# Patient Record
Sex: FEMALE | URBAN - METROPOLITAN AREA
[De-identification: names, ages, dates, MRNs, and addresses within clinical notes are randomized per-mention and may not be internally consistent; named-entity substitution may affect disease eponyms.]

---
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
## 2024-03-25 ENCOUNTER — TELEPHONE (OUTPATIENT)
Dept: OBSTETRICS AND GYNECOLOGY | Facility: CLINIC | Age: 19
End: 2024-03-25
Payer: MEDICAID

## 2024-03-25 ENCOUNTER — LAB VISIT (OUTPATIENT)
Dept: LAB | Facility: HOSPITAL | Age: 19
End: 2024-03-25
Attending: STUDENT IN AN ORGANIZED HEALTH CARE EDUCATION/TRAINING PROGRAM
Payer: MEDICAID

## 2024-03-25 ENCOUNTER — TELEPHONE (OUTPATIENT)
Dept: OBSTETRICS AND GYNECOLOGY | Facility: CLINIC | Age: 19
End: 2024-03-25

## 2024-03-25 DIAGNOSIS — Z32.01 POSITIVE PREGNANCY TEST: Primary | ICD-10-CM

## 2024-03-25 DIAGNOSIS — Z32.01 POSITIVE PREGNANCY TEST: ICD-10-CM

## 2024-03-25 LAB — HCG INTACT+B SERPL-ACNC: NORMAL MIU/ML

## 2024-03-25 PROCEDURE — 84144 ASSAY OF PROGESTERONE: CPT | Performed by: STUDENT IN AN ORGANIZED HEALTH CARE EDUCATION/TRAINING PROGRAM

## 2024-03-25 PROCEDURE — 84702 CHORIONIC GONADOTROPIN TEST: CPT | Performed by: STUDENT IN AN ORGANIZED HEALTH CARE EDUCATION/TRAINING PROGRAM

## 2024-03-25 PROCEDURE — 36415 COLL VENOUS BLD VENIPUNCTURE: CPT | Performed by: STUDENT IN AN ORGANIZED HEALTH CARE EDUCATION/TRAINING PROGRAM

## 2024-03-26 ENCOUNTER — TELEPHONE (OUTPATIENT)
Dept: OBSTETRICS AND GYNECOLOGY | Facility: CLINIC | Age: 19
End: 2024-03-26
Payer: MEDICAID

## 2024-03-26 LAB — PROGEST SERPL-MCNC: 14.4 NG/ML

## 2024-03-26 NOTE — TELEPHONE ENCOUNTER
----- Message from Margarita Agarwal MD sent at 3/25/2024  8:28 PM CDT -----  Her beta is real high. May well be farther along  Is this the earliest ultrasound appt?

## 2024-03-26 NOTE — TELEPHONE ENCOUNTER
Called pt to let her know that due to levels being so high we will get her in for her dating ultrasound and pregnancy confirmation sooner.

## 2024-03-27 ENCOUNTER — LAB VISIT (OUTPATIENT)
Dept: LAB | Facility: HOSPITAL | Age: 19
End: 2024-03-27
Attending: STUDENT IN AN ORGANIZED HEALTH CARE EDUCATION/TRAINING PROGRAM
Payer: MEDICAID

## 2024-03-27 DIAGNOSIS — Z32.01 POSITIVE PREGNANCY TEST: ICD-10-CM

## 2024-03-27 LAB — HCG INTACT+B SERPL-ACNC: NORMAL MIU/ML

## 2024-03-27 PROCEDURE — 84702 CHORIONIC GONADOTROPIN TEST: CPT | Performed by: STUDENT IN AN ORGANIZED HEALTH CARE EDUCATION/TRAINING PROGRAM

## 2024-03-27 PROCEDURE — 36415 COLL VENOUS BLD VENIPUNCTURE: CPT | Performed by: STUDENT IN AN ORGANIZED HEALTH CARE EDUCATION/TRAINING PROGRAM

## 2024-04-02 ENCOUNTER — HOSPITAL ENCOUNTER (OUTPATIENT)
Dept: PERINATAL CARE | Facility: OTHER | Age: 19
Discharge: HOME | End: 2024-04-02
Attending: STUDENT IN AN ORGANIZED HEALTH CARE EDUCATION/TRAINING PROGRAM
Payer: MEDICAID

## 2024-04-02 ENCOUNTER — OFFICE VISIT (OUTPATIENT)
Dept: OBSTETRICS AND GYNECOLOGY | Facility: CLINIC | Age: 19
End: 2024-04-02
Payer: MEDICAID

## 2024-04-02 VITALS
BODY MASS INDEX: 23.51 KG/M2 | SYSTOLIC BLOOD PRESSURE: 122 MMHG | HEIGHT: 63 IN | DIASTOLIC BLOOD PRESSURE: 66 MMHG | WEIGHT: 132.69 LBS

## 2024-04-02 DIAGNOSIS — Z32.01 POSITIVE PREGNANCY TEST: Primary | ICD-10-CM

## 2024-04-02 DIAGNOSIS — Z32.01 POSITIVE PREGNANCY TEST: ICD-10-CM

## 2024-04-02 LAB
C TRACH DNA SPEC QL NAA+PROBE: NOT DETECTED
N GONORRHOEA DNA SPEC QL NAA+PROBE: NOT DETECTED

## 2024-04-02 PROCEDURE — 3074F SYST BP LT 130 MM HG: CPT | Mod: CPTII,,, | Performed by: STUDENT IN AN ORGANIZED HEALTH CARE EDUCATION/TRAINING PROGRAM

## 2024-04-02 PROCEDURE — 76801 OB US < 14 WKS SINGLE FETUS: CPT | Mod: 26,,, | Performed by: OBSTETRICS & GYNECOLOGY

## 2024-04-02 PROCEDURE — 87591 N.GONORRHOEAE DNA AMP PROB: CPT | Performed by: STUDENT IN AN ORGANIZED HEALTH CARE EDUCATION/TRAINING PROGRAM

## 2024-04-02 PROCEDURE — 81514 NFCT DS BV&VAGINITIS DNA ALG: CPT | Performed by: STUDENT IN AN ORGANIZED HEALTH CARE EDUCATION/TRAINING PROGRAM

## 2024-04-02 PROCEDURE — 87491 CHLMYD TRACH DNA AMP PROBE: CPT | Performed by: STUDENT IN AN ORGANIZED HEALTH CARE EDUCATION/TRAINING PROGRAM

## 2024-04-02 PROCEDURE — 3078F DIAST BP <80 MM HG: CPT | Mod: CPTII,,, | Performed by: STUDENT IN AN ORGANIZED HEALTH CARE EDUCATION/TRAINING PROGRAM

## 2024-04-02 PROCEDURE — 99999 PR PBB SHADOW E&M-EST. PATIENT-LVL II: CPT | Mod: PBBFAC,,, | Performed by: STUDENT IN AN ORGANIZED HEALTH CARE EDUCATION/TRAINING PROGRAM

## 2024-04-02 PROCEDURE — 87086 URINE CULTURE/COLONY COUNT: CPT | Performed by: STUDENT IN AN ORGANIZED HEALTH CARE EDUCATION/TRAINING PROGRAM

## 2024-04-02 PROCEDURE — 99204 OFFICE O/P NEW MOD 45 MIN: CPT | Mod: S$PBB,TH,, | Performed by: STUDENT IN AN ORGANIZED HEALTH CARE EDUCATION/TRAINING PROGRAM

## 2024-04-02 PROCEDURE — 99212 OFFICE O/P EST SF 10 MIN: CPT | Mod: PBBFAC,25,TH | Performed by: STUDENT IN AN ORGANIZED HEALTH CARE EDUCATION/TRAINING PROGRAM

## 2024-04-02 PROCEDURE — 76801 OB US < 14 WKS SINGLE FETUS: CPT

## 2024-04-02 PROCEDURE — 3008F BODY MASS INDEX DOCD: CPT | Mod: CPTII,,, | Performed by: STUDENT IN AN ORGANIZED HEALTH CARE EDUCATION/TRAINING PROGRAM

## 2024-04-02 RX ORDER — ASPIRIN 81 MG/1
81 TABLET ORAL DAILY
Qty: 150 TABLET | Refills: 2 | Status: SHIPPED | OUTPATIENT
Start: 2024-04-02 | End: 2025-04-02

## 2024-04-02 NOTE — PROGRESS NOTES
History & Physical  Gynecology      SUBJECTIVE:     Chief Complaint: Amenorrhea (Pt states that she has been having nausea all day )       History of Present Illness:  Pt is here for initial OB. She is with her mother. Feeling ok. Nausea persistent. Some constipation.   Feeling fatigued. Also has breast tenderness. No bleeding or pain.  Works as working and in school. Works at a [employer]. Works construction.   Relationship with partner living together with her mother and brothers. Safe at home. No cats.   Taking PNV   PMH: PNV  PSH: no abdominal or pelvic surgeries  Prior pregnancies none  Desires patch for contraception  Wants to breastfeed  Rojelio's mother has SCt. Ezra arroyo has crohn's and shizophrenia  Her mother had DM and HTN  No personal or family history of thyroid disease  No tobacco, EtOH or illicit drug use  No H/o HSV  Covid vaccinated       Review of patient's allergies indicates:  No Known Allergies    History reviewed. No pertinent past medical history.  History reviewed. No pertinent surgical history.  OB History    No obstetric history on file.       Family History   Problem Relation Age of Onset    Diabetes Maternal Grandfather     Diabetes Paternal Grandfather     Hypertension Paternal Grandfather     Asthma Brother      Social History     Tobacco Use    Smoking status: Never    Smokeless tobacco: Never   Substance Use Topics    Alcohol use: No    Drug use: No       Current Outpatient Medications   Medication Sig    prenatal no115/iron/folic acid (PRENATAL 19 ORAL) Take by mouth.    acetaminophen (TYLENOL) 500 MG tablet Take 1 tablet (500 mg total) by mouth every 4 (four) hours as needed for Pain or Temperature greater than (100.5 or greater). (Patient not taking: Reported on 4/2/2024)    cetirizine (ZYRTEC) 10 MG tablet Take 1 tablet (10 mg total) by mouth once daily.    clobetasol 0.05% (TEMOVATE) 0.05 % Oint Apply topically nightly.     dextroamphetamine-amphetamine (ADDERALL XR) 10 MG 24 hr capsule Take 1 capsule by mouth every morning (Patient not taking: Reported on 4/2/2024)    fluticasone propionate (FLONASE) 50 mcg/actuation nasal spray 1 spray (50 mcg total) by Each Nostril route once daily. (Patient not taking: Reported on 4/2/2024)    hydrocortisone (WESTCORT) 0.2 % cream Apply to eczema bid    ibuprofen (ADVIL,MOTRIN) 400 MG tablet Take 1 tablet (400 mg total) by mouth every 6 (six) hours as needed for Other or Temperature greater than (pain or temperature of 100.5 or greater). (Patient not taking: Reported on 4/2/2024)    loratadine (CLARITIN) 5 mg/5 mL syrup Take two teaspoons (10 ml) by mouth daily as needed for congestion    mometasone 0.1% (ELOCON) 0.1 % cream Apply to affected area daily    norelgestromin-ethinyl estradiol (XULANE) 150-35 mcg/24 hr Place 1 patch onto the skin.    phenoL (CHLORASEPTIC THROAT SPRAY) 1.4 % SprA by Mucous Membrane route every 2 (two) hours. (Patient not taking: Reported on 4/2/2024)    ranitidine (ZANTAC) 15 mg/mL syrup 10 ml PO BID for reflux pain    triamcinolone acetonide 0.1% (KENALOG) 0.1 % cream Apply topically 3 (three) times daily as needed.     No current facility-administered medications for this visit.         Review of Systems:  Review of Systems   Constitutional:  Positive for fatigue. Negative for activity change, appetite change and fever.   Gastrointestinal:  Positive for nausea and vomiting. Negative for abdominal pain.   Genitourinary:  Positive for menstrual problem. Negative for dysuria, hematuria, pelvic pain, vaginal bleeding, vaginal discharge, vaginal pain and vaginal odor.   Integumentary:  Negative for breast mass and nipple discharge.   Breast: Positive for mastodynia.Negative for lump, mass and nipple discharge     OBJECTIVE:     Physical Exam:  Physical Exam  Vitals reviewed.   Constitutional:       General: She is not in acute distress.     Appearance: She is  well-developed. She is not diaphoretic.   HENT:      Head: Normocephalic and atraumatic.   Eyes:      Conjunctiva/sclera: Conjunctivae normal.   Cardiovascular:      Rate and Rhythm: Normal rate.   Pulmonary:      Effort: Pulmonary effort is normal.   Abdominal:      General: There is no distension.      Palpations: Abdomen is soft. There is no mass.      Tenderness: There is no abdominal tenderness. There is no guarding or rebound.   Genitourinary:     Labia:         Right: No rash, tenderness, lesion or injury.         Left: No rash, tenderness, lesion or injury.       Vagina: No signs of injury and foreign body. No vaginal discharge, erythema, tenderness or bleeding.      Cervix: No cervical motion tenderness, discharge or friability.      Uterus: Not deviated, not enlarged, not fixed and not tender.       Adnexa:         Right: No mass, tenderness or fullness.          Left: No mass, tenderness or fullness.     Musculoskeletal:         General: Normal range of motion.      Cervical back: Normal range of motion.   Skin:     General: Skin is warm and dry.   Neurological:      Mental Status: She is alert.       ASSESSMENT:     No diagnosis found.       Plan:      - Patient's medical, surgical and family history reviewed. Medications reviewed.  - Early DM screening not indicated   - Discussed common complaints of pregnancy, home measures for N/V or pregnancy  - Info for [email or web address] given   - Patient was counseled today on: Blood test ordered on first visit, Vitamins, folic acid, Seafood and mercury, avoid , un refrigerated: deli meats, cheeses and milk products, reason to avoid cat litter,Toxoplasmosis precautions (Cats/Raw Meat) the accuracy of the MARISOL, the value of an early TVS, risks of each trimester, Sequential screening, OTC medication in the first trimester, harmful effects of Smoking, ETOH, recreational drugs, AFP screen at 15 weeks and MFM USG at 18-20 weeks.  HIV and other routine prenatal  tests,risk factors identified by prenatal history, Anticipated course of prenatal care, Nutrition and weight gain counseling, Exercise, Alcohol, Illicit/Recreational Drugs, Seat belt use.  - First trimester labs ordered.  - TVUS for dating reviewed .  - Genetic screening discussed and patient wishes to do M21 with AFP at 15 weeks.  - ASA recommended after 12 weeks      RTC 4 weeks for Routine OB Care.  Face to Face time with patient: 30    45 minutes of total time spent on the encounter, which includes face to face time and non-face to face time preparing to see the patient (eg, review of tests), Obtaining and/or reviewing separately obtained history, Documenting clinical information in the electronic or other health record, Independently interpreting results (not separately reported) and communicating results to the patient/family/caregiver, or Care coordination (not separately reported).      Margarita Agarwal

## 2024-04-02 NOTE — PATIENT INSTRUCTIONS
[email or web address]    Nausea and vomiting  Vitamin B6 (pyridoxine) 50-100mg orally with Doxylamine (unisom) 12.5mg orally every 6-8 hours as needed for nausea. If the fatigue is too bad, you can try just the B6. Lastly, if taking it as needed is not helping, consider taking the two together on a scheduled basis.    Medications  Avoid NSAIDs (aleve, motrin, ibuprofen)  Use Tylenol or Acetaminophen for aches/pains, headaches  Begin a baby aspirin once daily (81mg) after 12 weeks  Pecid up to twice a day  GasX or simethicone for gas pains  Colace for constipation  Medications that are pregnancy category A, B or C are accepted as safe during pregnancy    Genetic Testing  [id number]: best test we have, most sensitive. Tells gender. Call Toll Free to get survey to decrease cost [phone]. website has a video about the test KUBOO/videos  Sequential Screen: second best test includes ultrasound around 11 weeks and blood tests at 11 and 15 weeks  Quad screen: least sensitive for chromosomal issues, but would tell us about neural tube defects (such as spina bifida), blood work after 15 weeks   (Sequential or Quad do not tell sex of baby)    Caffiene  Less than 200mg per day    Exercise  Listen to your body  Don't stay with heart rate >140bpm    Weight Gain  -- Recommended weight gain of:          Underweight        Less than 18.5            28-40            Normal Weight     18.5-24.9                    25-35            Overweight          25-29.9                       15-25            Obese                  30 and greater             11-20      Covid  [email or web address]://[email or web address]/[email or web address]-health/faqs/[email or web address]-[email or web address]-breastfeeding    https://www.[facility].org/womens-health/experts-and-stories/the-latest/xqs-xa-bm-know-the-covid-19-vaccines-are-safe-and-effective-one-expert-explains    [email or web address]-[email or web address]

## 2024-04-04 ENCOUNTER — TELEPHONE (OUTPATIENT)
Dept: OBSTETRICS AND GYNECOLOGY | Facility: CLINIC | Age: 19
End: 2024-04-04
Payer: MEDICAID

## 2024-04-04 DIAGNOSIS — Z34.03 ENCOUNTER FOR SUPERVISION OF NORMAL FIRST PREGNANCY IN THIRD TRIMESTER: Primary | ICD-10-CM

## 2024-04-04 LAB — BACTERIA UR CULT: NORMAL

## 2024-05-15 ENCOUNTER — INITIAL PRENATAL (OUTPATIENT)
Dept: OBSTETRICS AND GYNECOLOGY | Facility: CLINIC | Age: 19
End: 2024-05-15
Payer: MEDICAID

## 2024-05-15 VITALS
DIASTOLIC BLOOD PRESSURE: 60 MMHG | SYSTOLIC BLOOD PRESSURE: 106 MMHG | BODY MASS INDEX: 25.81 KG/M2 | WEIGHT: 145.75 LBS

## 2024-05-15 DIAGNOSIS — Z28.39 RUBELLA NONIMMUNE STATUS, DELIVERED, CURRENT HOSPITALIZATION: ICD-10-CM

## 2024-05-15 DIAGNOSIS — Z34.00 SUPERVISION OF NORMAL FIRST PREGNANCY, ANTEPARTUM: Primary | ICD-10-CM

## 2024-05-15 PROCEDURE — 99204 OFFICE O/P NEW MOD 45 MIN: CPT | Mod: S$PBB,TH,, | Performed by: STUDENT IN AN ORGANIZED HEALTH CARE EDUCATION/TRAINING PROGRAM

## 2024-05-15 PROCEDURE — 99999 PR PBB SHADOW E&M-EST. PATIENT-LVL II: CPT | Mod: PBBFAC,,, | Performed by: STUDENT IN AN ORGANIZED HEALTH CARE EDUCATION/TRAINING PROGRAM

## 2024-05-15 PROCEDURE — 99212 OFFICE O/P EST SF 10 MIN: CPT | Mod: PBBFAC,TH | Performed by: STUDENT IN AN ORGANIZED HEALTH CARE EDUCATION/TRAINING PROGRAM

## 2024-05-15 NOTE — PROGRESS NOTES
Pt doing well. Unisom helps with nausea, but making her tired. Will try b6. Denies vaginal bleeding, headaches. Some cramping when urinating. Unsure if she is feeling quickening  VS reviewed.  Connected mom sent  Ordered today: anatomy scan  Glucola instructions completed  [facility] info given  RTC: 4 weeks

## 2024-05-15 NOTE — PATIENT INSTRUCTIONS
[facility], Labor and Delivery is on the 6th floor of [facility]: [phone]    [email or web address]      Vitamin B6 (pyridoxine) 10-25mg orally with Doxylamine (unisom) 12.5mg orally every 6-8 hours as needed for nausea. If the fatigue is too bad, you can try just the B6. Lastly, if taking it as needed is not helping, consider taking the two together on a scheduled basis.      Dear Rojelio Duong    Congratulations on your pregnancy! Dr. Agarwal has placed an order for you to join the [facility] Connected MOM program at no additional cost to you. Through this program, you can monitor your pregnancy from the comfort of home and stay connected to your OB Team between office visits.    When you sign up, you will receive a digital blood pressure cuff to send readings to your OB Team from home to ensure your readings are within healthy levels and catch potential issues sooner.    Get started now! 2 Easy Steps to Enroll:     Click [email or web address][here] to complete the consent questionnaire in your [facility] Account.        Receive your device and take your first reading to finish enrollment.   Your device can be delivered directly to you, or you can pick it up at an [facility] location.        Have questions? Learn more here!

## 2024-05-17 ENCOUNTER — PATIENT MESSAGE (OUTPATIENT)
Dept: OTHER | Facility: OTHER | Age: 19
End: 2024-05-17
Payer: MEDICAID

## 2024-06-04 ENCOUNTER — PROCEDURE VISIT (OUTPATIENT)
Dept: MATERNAL FETAL MEDICINE | Facility: CLINIC | Age: 19
End: 2024-06-04
Payer: MEDICAID

## 2024-06-04 DIAGNOSIS — Z34.00 SUPERVISION OF NORMAL FIRST PREGNANCY, ANTEPARTUM: ICD-10-CM

## 2024-06-04 PROCEDURE — 76805 OB US >/= 14 WKS SNGL FETUS: CPT | Mod: PBBFAC | Performed by: OBSTETRICS & GYNECOLOGY

## 2024-06-06 DIAGNOSIS — Z36.2 ENCOUNTER FOR FOLLOW-UP ULTRASOUND OF FETAL ANATOMY: Primary | ICD-10-CM

## 2024-06-07 ENCOUNTER — PATIENT MESSAGE (OUTPATIENT)
Dept: OTHER | Facility: OTHER | Age: 19
End: 2024-06-07
Payer: MEDICAID

## 2024-06-12 ENCOUNTER — TELEPHONE (OUTPATIENT)
Dept: OBSTETRICS AND GYNECOLOGY | Facility: CLINIC | Age: 19
End: 2024-06-12
Payer: MEDICAID

## 2024-07-04 ENCOUNTER — HOSPITAL ENCOUNTER (EMERGENCY)
Facility: OTHER | Age: 19
Discharge: HOME | End: 2024-07-04
Attending: OBSTETRICS & GYNECOLOGY
Payer: MEDICAID

## 2024-07-04 VITALS
HEART RATE: 82 BPM | DIASTOLIC BLOOD PRESSURE: 69 MMHG | RESPIRATION RATE: 14 BRPM | TEMPERATURE: 99 F | OXYGEN SATURATION: 100 % | SYSTOLIC BLOOD PRESSURE: 119 MMHG

## 2024-07-04 DIAGNOSIS — N93.9 VAGINAL BLEEDING: Primary | ICD-10-CM

## 2024-07-04 DIAGNOSIS — Z3A.23 23 WEEKS GESTATION OF PREGNANCY: ICD-10-CM

## 2024-07-04 LAB
ALBUMIN SERPL BCP-MCNC: 3.4 G/DL (ref 3.5–5.2)
ALP SERPL-CCNC: 69 U/L (ref 55–135)
ALT SERPL W/O P-5'-P-CCNC: 16 U/L (ref 10–44)
ANION GAP SERPL CALC-SCNC: 8 MMOL/L (ref 8–16)
AST SERPL-CCNC: 22 U/L (ref 10–40)
BASOPHILS # BLD AUTO: 0.03 K/UL (ref 0–0.2)
BASOPHILS NFR BLD: 0.3 % (ref 0–1.9)
BILIRUB SERPL-MCNC: 0.3 MG/DL (ref 0.1–1)
BILIRUB SERPL-MCNC: NORMAL MG/DL
BLOOD URINE, POC: NORMAL
BUN SERPL-MCNC: 8 MG/DL (ref 6–20)
CALCIUM SERPL-MCNC: 9 MG/DL (ref 8.7–10.5)
CHLORIDE SERPL-SCNC: 105 MMOL/L (ref 95–110)
CO2 SERPL-SCNC: 22 MMOL/L (ref 23–29)
COLOR, POC UA: YELLOW
CREAT SERPL-MCNC: 0.9 MG/DL (ref 0.5–1.4)
CREAT UR-MCNC: 105 MG/DL (ref 15–325)
DIFFERENTIAL METHOD BLD: ABNORMAL
EOSINOPHIL # BLD AUTO: 0.1 K/UL (ref 0–0.5)
EOSINOPHIL NFR BLD: 0.9 % (ref 0–8)
ERYTHROCYTE [DISTWIDTH] IN BLOOD BY AUTOMATED COUNT: 12.6 % (ref 11.5–14.5)
EST. GFR  (NO RACE VARIABLE): >60 ML/MIN/1.73 M^2
GLUCOSE SERPL-MCNC: 104 MG/DL (ref 70–110)
GLUCOSE UR QL STRIP: NORMAL
HCT VFR BLD AUTO: 33.6 % (ref 37–48.5)
HGB BLD-MCNC: 11.4 G/DL (ref 12–16)
IMM GRANULOCYTES # BLD AUTO: 0.1 K/UL (ref 0–0.04)
IMM GRANULOCYTES NFR BLD AUTO: 0.9 % (ref 0–0.5)
KETONES UR QL STRIP: NORMAL
LEUKOCYTE ESTERASE URINE, POC: NORMAL
LYMPHOCYTES # BLD AUTO: 2.2 K/UL (ref 1–4.8)
LYMPHOCYTES NFR BLD: 19.3 % (ref 18–48)
MCH RBC QN AUTO: 31.6 PG (ref 27–31)
MCHC RBC AUTO-ENTMCNC: 33.9 G/DL (ref 32–36)
MCV RBC AUTO: 93 FL (ref 82–98)
MONOCYTES # BLD AUTO: 0.6 K/UL (ref 0.3–1)
MONOCYTES NFR BLD: 5.6 % (ref 4–15)
NEUTROPHILS # BLD AUTO: 8.2 K/UL (ref 1.8–7.7)
NEUTROPHILS NFR BLD: 73 % (ref 38–73)
NITRITE, POC UA: NORMAL
NRBC BLD-RTO: 0 /100 WBC
PH, POC UA: 6
PLATELET # BLD AUTO: 205 K/UL (ref 150–450)
PMV BLD AUTO: 10.5 FL (ref 9.2–12.9)
POTASSIUM SERPL-SCNC: 3.8 MMOL/L (ref 3.5–5.1)
PROT SERPL-MCNC: 8 G/DL (ref 6–8.4)
PROT UR-MCNC: 10 MG/DL (ref 0–15)
PROT/CREAT UR: 0.1 MG/G{CREAT} (ref 0–0.2)
PROTEIN, POC: NORMAL
RBC # BLD AUTO: 3.61 M/UL (ref 4–5.4)
SODIUM SERPL-SCNC: 135 MMOL/L (ref 136–145)
SPECIFIC GRAVITY, POC UA: 1.01
UROBILINOGEN, POC UA: NORMAL
WBC # BLD AUTO: 11.19 K/UL (ref 3.9–12.7)

## 2024-07-04 PROCEDURE — 80053 COMPREHEN METABOLIC PANEL: CPT | Performed by: OBSTETRICS & GYNECOLOGY

## 2024-07-04 PROCEDURE — 99284 EMERGENCY DEPT VISIT MOD MDM: CPT | Mod: 25,,, | Performed by: OBSTETRICS & GYNECOLOGY

## 2024-07-04 PROCEDURE — 99284 EMERGENCY DEPT VISIT MOD MDM: CPT | Mod: 25

## 2024-07-04 PROCEDURE — 59025 FETAL NON-STRESS TEST: CPT

## 2024-07-04 PROCEDURE — 82570 ASSAY OF URINE CREATININE: CPT | Performed by: OBSTETRICS & GYNECOLOGY

## 2024-07-04 PROCEDURE — 59025 FETAL NON-STRESS TEST: CPT | Mod: 26,,, | Performed by: OBSTETRICS & GYNECOLOGY

## 2024-07-04 PROCEDURE — 85025 COMPLETE CBC W/AUTO DIFF WBC: CPT | Performed by: OBSTETRICS & GYNECOLOGY

## 2024-07-04 PROCEDURE — 81002 URINALYSIS NONAUTO W/O SCOPE: CPT

## 2024-07-04 NOTE — ED PROVIDER NOTES
Encounter Date: 2024       History     Chief Complaint   Patient presents with    Vaginal Bleeding     Rojelio Duong is a 19 y.o. F at 23w6d who presents for bleeding with wiping, onset this AM. Patient reports that she has a history of constipation and took a laxative this AM. She reports 3 episodes of Bms and each time she had a BM she reports that she saw blood with wiping. She believes that the bleeding is from the vagina. She denies any contractions, pain with urination, itching or burning.   This IUP is uncomplicated.  Patient denies contractions, denies vaginal bleeding, denies LOF.   Fetal Movement: normal       Review of patient's allergies indicates:  No Known Allergies  No past medical history on file.  No past surgical history on file.  Family History   Problem Relation Name Age of Onset    Diabetes Maternal Grandfather      Diabetes Paternal Grandfather      Hypertension Paternal Grandfather      Asthma Brother       Social History     Tobacco Use    Smoking status: Never    Smokeless tobacco: Never   Substance Use Topics    Alcohol use: No    Drug use: No     Review of Systems   Constitutional:  Negative for chills and fever.   HENT:  Negative for congestion and sore throat.    Eyes:  Negative for visual disturbance.   Respiratory:  Negative for cough and shortness of breath.    Cardiovascular:  Negative for chest pain and palpitations.   Gastrointestinal:  Negative for abdominal pain, blood in stool, constipation, diarrhea, nausea and vomiting.   Genitourinary:  Positive for vaginal bleeding (bleeding with wiping). Negative for dysuria, hematuria and vaginal discharge.   Musculoskeletal:  Negative for back pain.   Neurological:  Negative for dizziness, light-headedness and headaches.       Physical Exam     Initial Vitals [24 1214]   BP Pulse Resp Temp SpO2   (!) 137/91 95 14 99 °F (37.2 °C) 100 %      MAP       --         Physical Exam    Vitals reviewed.  Constitutional: She appears  well-developed and well-nourished.   HENT:   Head: Normocephalic and atraumatic.   Eyes: EOM are normal.   Cardiovascular:  Normal rate.           Pulmonary/Chest: No respiratory distress.   Abdominal: Abdomen is soft. There is no abdominal tenderness.   Gravid abdomen There is no rebound and no guarding.   Musculoskeletal:         General: No tenderness or edema.     Neurological: She is alert and oriented to person, place, and time.   Skin: Skin is warm and dry.   Psychiatric: She has a normal mood and affect. Her behavior is normal. Thought content normal.     OB LABOR EXAM:       Method: Sterile vaginal exam per MD and Sterile speculum exam per MD.       Dilatation: 0.   Station: -3.   Effacement: 50%.       Comments: SVE: cl/th/hi  SSE: negative blood in the vaginal vault, no bleeding at the cervix       ED Course   Obtain Fetal nonstress test (NST)    Date/Time: 7/4/2024 1:25 PM    Performed by: Angie Jenkins MD  Authorized by: Angie Jenkins MD    Nonstress Test:     Variability:  6-25 BPM    Decelerations:  None    Accelerations:  15 bpm    Baseline:  150    Contractions:  Not present  Biophysical Profile:     Nonstress Test Interpretation: reactive      Overall Impression:  Reassuring  Post-procedure:     Patient tolerance:  Patient tolerated the procedure well with no immediate complications    Labs Reviewed   COMPREHENSIVE METABOLIC PANEL - Abnormal; Notable for the following components:       Result Value    Sodium 135 (*)     CO2 22 (*)     Albumin 3.4 (*)     All other components within normal limits   CBC W/ AUTO DIFFERENTIAL - Abnormal; Notable for the following components:    RBC 3.61 (*)     Hemoglobin 11.4 (*)     Hematocrit 33.6 (*)     MCH 31.6 (*)     Immature Granulocytes 0.9 (*)     Gran # (ANC) 8.2 (*)     Immature Grans (Abs) 0.10 (*)     All other components within normal limits   PROTEIN / CREATININE RATIO, URINE    Narrative:     Specimen Source->Urine   POCT URINALYSIS, DIPSTICK OR  TABLET REAGENT, AUTOMATED, WITH MICROSCOP     Recent Labs   Lab 24  1241 24  1256   WBC  --  11.19   HGB  --  11.4*   HCT  --  33.6*   PLT  --  205   BUN  --  8   CREATININE  --  0.9   ALT  --  16   AST  --  22   UTPCR 0.10  --              Imaging Results    None          Medications - No data to display  Medical Decision Making  19 y.o. F at 23w6d who presents for bleeding with wiping, onset this AM.    Temp:  [99 °F (37.2 °C)] 99 °F (37.2 °C)  Pulse:  [] 82  Resp:  [14] 14  SpO2:  [100 %] 100 %  BP: (118-137)/(69-91) 119/69    NST: reactive and reassuring  TOCO: without contractions    SVE: cl/th/hi  SSE: negative blood in the vaginal vault, no bleeding at the cervix  No hemorrhoids noted     Elevated BP x1  - BP: (118-137)/(69-91) 119/69   - CBC: 11.4/33.6/205  - Cr: 0.9  - AST/ALT: 22/16  - P/C Ratio: 0.10    - Patient stable for discharge at this time  - ED return precautions given  - She voiced understanding and is in agreement with the plan     Angie Jenkins MD  Obstetrics and Gynecology - PGY2     Amount and/or Complexity of Data Reviewed  Labs: ordered.              Attending Attestation:   Physician Attestation Statement for Resident:  As the supervising MD   Physician Attestation Statement: I have personally seen and examined this patient.   I agree with the above history.  -:   As the supervising MD I agree with the above PE.     As the supervising MD I agree with the above treatment, course, plan, and disposition.   -: I agree with the above edited resident note. Pt seen and examined, chart and labs reviewed.    Briefly, 18 yo G1 at 23w6d presents for an episode of bleeding with wiping today. Afebrile, VSS, single mild range BP on arrival to [facility]. Asymptomatic for S/S of PreE, labs obtained and unremarkable- Plts 205, Cr 0.9, AST/ALT 22/16 and P:C 0.10. NST Cat I appropriate for GA, [place] quiet. SSE with long/closed cervix and no visible blood in vault or from os. No hemorrhoids  noted. Pt reassured by normal findings. Bleeding return precautions reviewed.     All questions answered. Stable for d/c home with outpatient follow up.     Yolis Chau MD  OB Hospitalist  7/4/2024     I was personally present during the critical portions of the procedure(s) performed by the resident and was immediately available in the ED to provide services and assistance as needed during the entire procedure.  I have reviewed and agree with the residents interpretation of the following: lab data.  I have reviewed the following: old records at this facility.                                       Clinical Impression:  Final diagnoses:  [N93.9] Vaginal bleeding (Primary)  [Z3A.23] 23 weeks gestation of pregnancy          ED Disposition Condition    Discharge Stable          ED Prescriptions    None       Follow-up Information    None          Angie Jenkins MD  Resident  07/04/24 [id number]       Yolis Chau MD  07/04/24 [id number]

## 2024-07-04 NOTE — DISCHARGE INSTRUCTIONS
Return if you experience contractions, uterine tightening or cramps(more than 4 in 1 hour for 2 consecutive hours)  Backache that comes and goes  Pelvic pressure  Change in vaginal discharge  Vaginal Bleeding  Leaking of fluid  Call the OB [phone]) during regular business hours or L&D([phone]) after hours for any questions or concerns  Keep previously scheduled clinic appointment  Drink 8-10 glasses of water a day     
Yes

## 2024-07-05 ENCOUNTER — PATIENT MESSAGE (OUTPATIENT)
Dept: OTHER | Facility: OTHER | Age: 19
End: 2024-07-05
Payer: MEDICAID

## 2024-07-16 ENCOUNTER — LAB VISIT (OUTPATIENT)
Dept: LAB | Facility: OTHER | Age: 19
End: 2024-07-16
Attending: STUDENT IN AN ORGANIZED HEALTH CARE EDUCATION/TRAINING PROGRAM
Payer: MEDICAID

## 2024-07-16 ENCOUNTER — PROCEDURE VISIT (OUTPATIENT)
Dept: MATERNAL FETAL MEDICINE | Facility: CLINIC | Age: 19
End: 2024-07-16
Payer: MEDICAID

## 2024-07-16 ENCOUNTER — ROUTINE PRENATAL (OUTPATIENT)
Dept: OBSTETRICS AND GYNECOLOGY | Facility: CLINIC | Age: 19
End: 2024-07-16
Payer: MEDICAID

## 2024-07-16 VITALS
DIASTOLIC BLOOD PRESSURE: 78 MMHG | WEIGHT: 164.44 LBS | SYSTOLIC BLOOD PRESSURE: 138 MMHG | BODY MASS INDEX: 29.13 KG/M2

## 2024-07-16 DIAGNOSIS — Z32.01 POSITIVE PREGNANCY TEST: ICD-10-CM

## 2024-07-16 DIAGNOSIS — Z3A.25 25 WEEKS GESTATION OF PREGNANCY: Primary | ICD-10-CM

## 2024-07-16 DIAGNOSIS — Z36.2 ENCOUNTER FOR FOLLOW-UP ULTRASOUND OF FETAL ANATOMY: ICD-10-CM

## 2024-07-16 LAB
BASOPHILS # BLD AUTO: 0.03 K/UL (ref 0–0.2)
BASOPHILS NFR BLD: 0.3 % (ref 0–1.9)
DIFFERENTIAL METHOD BLD: ABNORMAL
EOSINOPHIL # BLD AUTO: 0.1 K/UL (ref 0–0.5)
EOSINOPHIL NFR BLD: 0.8 % (ref 0–8)
ERYTHROCYTE [DISTWIDTH] IN BLOOD BY AUTOMATED COUNT: 12.4 % (ref 11.5–14.5)
GLUCOSE SERPL-MCNC: 110 MG/DL (ref 70–140)
HCT VFR BLD AUTO: 31.6 % (ref 37–48.5)
HGB BLD-MCNC: 10.3 G/DL (ref 12–16)
IMM GRANULOCYTES # BLD AUTO: 0.11 K/UL (ref 0–0.04)
IMM GRANULOCYTES NFR BLD AUTO: 1 % (ref 0–0.5)
LYMPHOCYTES # BLD AUTO: 1.5 K/UL (ref 1–4.8)
LYMPHOCYTES NFR BLD: 14 % (ref 18–48)
MCH RBC QN AUTO: 30.7 PG (ref 27–31)
MCHC RBC AUTO-ENTMCNC: 32.6 G/DL (ref 32–36)
MCV RBC AUTO: 94 FL (ref 82–98)
MONOCYTES # BLD AUTO: 0.5 K/UL (ref 0.3–1)
MONOCYTES NFR BLD: 5 % (ref 4–15)
NEUTROPHILS # BLD AUTO: 8.4 K/UL (ref 1.8–7.7)
NEUTROPHILS NFR BLD: 78.9 % (ref 38–73)
NRBC BLD-RTO: 0 /100 WBC
PLATELET # BLD AUTO: 179 K/UL (ref 150–450)
PMV BLD AUTO: 10.9 FL (ref 9.2–12.9)
RBC # BLD AUTO: 3.35 M/UL (ref 4–5.4)
WBC # BLD AUTO: 10.69 K/UL (ref 3.9–12.7)

## 2024-07-16 PROCEDURE — 82950 GLUCOSE TEST: CPT | Performed by: STUDENT IN AN ORGANIZED HEALTH CARE EDUCATION/TRAINING PROGRAM

## 2024-07-16 PROCEDURE — 86780 TREPONEMA PALLIDUM: CPT | Performed by: STUDENT IN AN ORGANIZED HEALTH CARE EDUCATION/TRAINING PROGRAM

## 2024-07-16 PROCEDURE — 86787 VARICELLA-ZOSTER ANTIBODY: CPT | Performed by: STUDENT IN AN ORGANIZED HEALTH CARE EDUCATION/TRAINING PROGRAM

## 2024-07-16 PROCEDURE — 36415 COLL VENOUS BLD VENIPUNCTURE: CPT | Performed by: STUDENT IN AN ORGANIZED HEALTH CARE EDUCATION/TRAINING PROGRAM

## 2024-07-16 PROCEDURE — 85025 COMPLETE CBC W/AUTO DIFF WBC: CPT | Performed by: STUDENT IN AN ORGANIZED HEALTH CARE EDUCATION/TRAINING PROGRAM

## 2024-07-16 PROCEDURE — 76816 OB US FOLLOW-UP PER FETUS: CPT | Mod: PBBFAC | Performed by: OBSTETRICS & GYNECOLOGY

## 2024-07-16 PROCEDURE — 99999 PR PBB SHADOW E&M-EST. PATIENT-LVL III: CPT | Mod: PBBFAC,,,

## 2024-07-16 PROCEDURE — 99213 OFFICE O/P EST LOW 20 MIN: CPT | Mod: PBBFAC,25

## 2024-07-16 PROCEDURE — 99213 OFFICE O/P EST LOW 20 MIN: CPT | Mod: TH,S$PBB,,

## 2024-07-16 NOTE — PROGRESS NOTES
Reason for visit: Routine Prenatal Visit      HPI:   19 y.o., at 25w4d by Estimated Date of Delivery: 10/25/24  Doing well. States she feels lots of movement. Did go to OB ED 24 for vaginal bleeding, cervix closed.     - Contractions: no  - Bleeding: no  - Loss of fluid: no  - Fetal movement: yes  - Nausea: no  - Vomiting: no  - Headache: no    Reviewed:    Past medical, surgical, social, family, and obstetric history: Reviewed and updated in EMR.  Medications: Reviewed and updated in EMR.  Allergies: Patient has no known allergies.    Pregnancy dating, labs, ultrasound reports, prenatal testing, and problem list: Reviewed and updated in EMR.  Outside records: no  Independent interpretation of tests: no  Discussion with another healthcare professional: no    Vitals: /78   Wt 74.6 kg (164 lb 7.4 oz)   BMI 29.13 kg/m²     Physical exam:  GENERAL: No acute distress  ABD: Gravid    Assessment and Plan:    25 weeks gestation of pregnancy      Glucose and CBC today  Follow up anatomy today   Discussed connected mom, has not set it up yet     labor precautions given  Follow-up: 4 weeks      I spent a total of 20 minutes on the day of the visit. This includes face to face time and non-face to face time preparing to see the patient (eg, review of tests), Obtaining and/or reviewing separately obtained history, Documenting clinical information in the electronic or other health record, Independently interpreting results and communicating results to the patient/family/caregiver, or Care coordination.

## 2024-07-17 LAB
VARICELLA INTERPRETATION: POSITIVE
VARICELLA ZOSTER IGG: 220

## 2024-07-18 LAB — T PALLIDUM AB SER QL IF: NORMAL

## 2024-07-19 ENCOUNTER — PATIENT MESSAGE (OUTPATIENT)
Dept: OTHER | Facility: OTHER | Age: 19
End: 2024-07-19
Payer: MEDICAID

## 2024-08-02 ENCOUNTER — PATIENT MESSAGE (OUTPATIENT)
Dept: OTHER | Facility: OTHER | Age: 19
End: 2024-08-02
Payer: MEDICAID

## 2024-08-13 ENCOUNTER — PATIENT MESSAGE (OUTPATIENT)
Dept: OBSTETRICS AND GYNECOLOGY | Facility: CLINIC | Age: 19
End: 2024-08-13

## 2024-08-13 ENCOUNTER — CLINICAL SUPPORT (OUTPATIENT)
Dept: OBSTETRICS AND GYNECOLOGY | Facility: CLINIC | Age: 19
End: 2024-08-13
Payer: MEDICAID

## 2024-08-13 ENCOUNTER — ROUTINE PRENATAL (OUTPATIENT)
Dept: OBSTETRICS AND GYNECOLOGY | Facility: CLINIC | Age: 19
End: 2024-08-13
Payer: MEDICAID

## 2024-08-13 VITALS
BODY MASS INDEX: 28.86 KG/M2 | WEIGHT: 162.94 LBS | DIASTOLIC BLOOD PRESSURE: 78 MMHG | SYSTOLIC BLOOD PRESSURE: 122 MMHG

## 2024-08-13 DIAGNOSIS — Z34.00 SUPERVISION OF NORMAL FIRST PREGNANCY, ANTEPARTUM: Primary | ICD-10-CM

## 2024-08-13 DIAGNOSIS — Z23 NEED FOR TDAP VACCINATION: Primary | ICD-10-CM

## 2024-08-13 PROCEDURE — 99999 PR PBB SHADOW E&M-EST. PATIENT-LVL III: CPT | Mod: PBBFAC,,, | Performed by: STUDENT IN AN ORGANIZED HEALTH CARE EDUCATION/TRAINING PROGRAM

## 2024-08-13 PROCEDURE — 90715 TDAP VACCINE 7 YRS/> IM: CPT | Mod: PBBFAC

## 2024-08-13 PROCEDURE — 99213 OFFICE O/P EST LOW 20 MIN: CPT | Mod: PBBFAC,TH | Performed by: STUDENT IN AN ORGANIZED HEALTH CARE EDUCATION/TRAINING PROGRAM

## 2024-08-13 PROCEDURE — 99999PBSHW TDAP VACCINE GREATER THAN OR EQUAL TO 7YO IM: Mod: PBBFAC,,,

## 2024-08-13 PROCEDURE — 90471 IMMUNIZATION ADMIN: CPT | Mod: PBBFAC

## 2024-08-13 NOTE — PROGRESS NOTES
Here for Tdap injection. Patient without complaint of pain at this time, injection given. Tolerated well no pain noted post injection advised to wait in lobby 15 minutes and report any adverse reactions.     Site:rd

## 2024-08-13 NOTE — PATIENT INSTRUCTIONS
[facility], Labor and Delivery is on the 6th floor of [facility]: [phone]    [email or web address]

## 2024-08-13 NOTE — PROGRESS NOTES
Pt doing well. GERD. Not taking rx. 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 Work restrictions letter given  Denies vaginal bleeding, LOF, contractions. Reports regular fetal movement. Denies symptoms of pre E.  VS reviewed.  Tdap today  RSV recs reviewed   Peds list given  Labor and pre E precautions reviewed.   RTC: 2-4 weeks

## 2024-08-16 ENCOUNTER — PATIENT MESSAGE (OUTPATIENT)
Dept: OTHER | Facility: OTHER | Age: 19
End: 2024-08-16
Payer: MEDICAID

## 2024-08-30 ENCOUNTER — PATIENT MESSAGE (OUTPATIENT)
Dept: OTHER | Facility: OTHER | Age: 19
End: 2024-08-30
Payer: MEDICAID

## 2024-09-10 ENCOUNTER — ROUTINE PRENATAL (OUTPATIENT)
Dept: OBSTETRICS AND GYNECOLOGY | Facility: CLINIC | Age: 19
End: 2024-09-10
Payer: MEDICAID

## 2024-09-10 VITALS
BODY MASS INDEX: 30.19 KG/M2 | WEIGHT: 170.44 LBS | SYSTOLIC BLOOD PRESSURE: 118 MMHG | DIASTOLIC BLOOD PRESSURE: 68 MMHG

## 2024-09-10 DIAGNOSIS — Z34.00 SUPERVISION OF NORMAL FIRST PREGNANCY, ANTEPARTUM: Primary | ICD-10-CM

## 2024-09-10 DIAGNOSIS — O26.843 UTERINE SIZE-DATE DISCREPANCY IN THIRD TRIMESTER: ICD-10-CM

## 2024-09-10 PROCEDURE — 99213 OFFICE O/P EST LOW 20 MIN: CPT | Mod: PBBFAC,TH | Performed by: STUDENT IN AN ORGANIZED HEALTH CARE EDUCATION/TRAINING PROGRAM

## 2024-09-10 PROCEDURE — 99999 PR PBB SHADOW E&M-EST. PATIENT-LVL III: CPT | Mod: PBBFAC,,, | Performed by: STUDENT IN AN ORGANIZED HEALTH CARE EDUCATION/TRAINING PROGRAM

## 2024-09-10 NOTE — PROGRESS NOTES
Pt doing well. Has had some URI symptoms. Not fully better after a few weeks of OTC old and flu. Post nasal irritation. Flonase sent  Denies vaginal bleeding, LOF, contractions. Reports regular fetal movement. Denies symptoms of pre E.  VS reviewed.  Ordered today: growths>d, fundal height from 28 to 34.5 today  Flu vaccine recs reviewed   Pump rx given  Labor and pre E precautions reviewed.   RTC: 2 weeks

## 2024-09-10 NOTE — PATIENT INSTRUCTIONS
Bring the paper breast pump prescription to this location:    [email or web address]  [address] [place] [address]  [phone]

## 2024-09-18 ENCOUNTER — OFFICE VISIT (OUTPATIENT)
Dept: OBSTETRICS AND GYNECOLOGY | Facility: CLINIC | Age: 19
End: 2024-09-18
Payer: MEDICAID

## 2024-09-18 ENCOUNTER — PROCEDURE VISIT (OUTPATIENT)
Dept: MATERNAL FETAL MEDICINE | Facility: CLINIC | Age: 19
End: 2024-09-18
Payer: MEDICAID

## 2024-09-18 DIAGNOSIS — Z34.00 SUPERVISION OF NORMAL FIRST PREGNANCY, ANTEPARTUM: ICD-10-CM

## 2024-09-18 DIAGNOSIS — Z34.00 SUPERVISION OF NORMAL FIRST PREGNANCY, ANTEPARTUM: Primary | ICD-10-CM

## 2024-09-18 DIAGNOSIS — O26.843 UTERINE SIZE-DATE DISCREPANCY IN THIRD TRIMESTER: ICD-10-CM

## 2024-09-18 PROCEDURE — 76816 OB US FOLLOW-UP PER FETUS: CPT | Mod: PBBFAC | Performed by: OBSTETRICS & GYNECOLOGY

## 2024-09-18 PROCEDURE — 99213 OFFICE O/P EST LOW 20 MIN: CPT | Mod: TH,95,, | Performed by: STUDENT IN AN ORGANIZED HEALTH CARE EDUCATION/TRAINING PROGRAM

## 2024-09-18 NOTE — PATIENT INSTRUCTIONS
[facility], Labor and Delivery is on the 6th floor of [facility]: [phone]    [email or web address]

## 2024-09-18 NOTE — PROGRESS NOTES
The patient location is:  Patient Home   The chief complaint leading to consultation is: MARLINE  Visit type: Virtual visit with synchronous audio and video  Total time spent with patient: 30 min  Each patient to whom he or she provides medical services by telemedicine is:  (1) informed of the relationship between the physician and patient and the respective role of any other health care provider with respect to management of the patient; and (2) notified that he or she may decline to receive medical services by telemedicine and may withdraw from such care at any time.    Pt doing well. Pain was still an issue, so has stopped working and hoping to get some relief with rest  Denies vaginal bleeding, LOF, contractions. Reports regular fetal movement. Denies symptoms of pre E.  Growth scan scheduled for today  Reviewed consents  Reviewed routines/expectations on L&D/MBU  Labor and pre E precautions reviewed.   All questions answered  RTC: 2 weeks

## 2024-09-20 ENCOUNTER — PATIENT MESSAGE (OUTPATIENT)
Dept: OTHER | Facility: OTHER | Age: 19
End: 2024-09-20
Payer: MEDICAID

## 2024-10-08 ENCOUNTER — LAB VISIT (OUTPATIENT)
Dept: LAB | Facility: OTHER | Age: 19
End: 2024-10-08
Attending: STUDENT IN AN ORGANIZED HEALTH CARE EDUCATION/TRAINING PROGRAM
Payer: MEDICAID

## 2024-10-08 DIAGNOSIS — Z32.01 POSITIVE PREGNANCY TEST: ICD-10-CM

## 2024-10-08 DIAGNOSIS — Z34.03 ENCOUNTER FOR SUPERVISION OF NORMAL FIRST PREGNANCY IN THIRD TRIMESTER: ICD-10-CM

## 2024-10-08 LAB
BASOPHILS # BLD AUTO: 0.02 K/UL (ref 0–0.2)
BASOPHILS NFR BLD: 0.2 % (ref 0–1.9)
DIFFERENTIAL METHOD BLD: ABNORMAL
EOSINOPHIL # BLD AUTO: 0.1 K/UL (ref 0–0.5)
EOSINOPHIL NFR BLD: 1 % (ref 0–8)
ERYTHROCYTE [DISTWIDTH] IN BLOOD BY AUTOMATED COUNT: 14 % (ref 11.5–14.5)
HCT VFR BLD AUTO: 28.9 % (ref 37–48.5)
HGB BLD-MCNC: 9.4 G/DL (ref 12–16)
HIV 1+2 AB+HIV1 P24 AG SERPL QL IA: NEGATIVE
IMM GRANULOCYTES # BLD AUTO: 0.15 K/UL (ref 0–0.04)
IMM GRANULOCYTES NFR BLD AUTO: 1.2 % (ref 0–0.5)
LYMPHOCYTES # BLD AUTO: 2.3 K/UL (ref 1–4.8)
LYMPHOCYTES NFR BLD: 18.4 % (ref 18–48)
MCH RBC QN AUTO: 28.2 PG (ref 27–31)
MCHC RBC AUTO-ENTMCNC: 32.5 G/DL (ref 32–36)
MCV RBC AUTO: 87 FL (ref 82–98)
MONOCYTES # BLD AUTO: 0.8 K/UL (ref 0.3–1)
MONOCYTES NFR BLD: 6.3 % (ref 4–15)
NEUTROPHILS # BLD AUTO: 9.1 K/UL (ref 1.8–7.7)
NEUTROPHILS NFR BLD: 72.9 % (ref 38–73)
NRBC BLD-RTO: 0 /100 WBC
PLATELET # BLD AUTO: 177 K/UL (ref 150–450)
PMV BLD AUTO: 12 FL (ref 9.2–12.9)
RBC # BLD AUTO: 3.33 M/UL (ref 4–5.4)
TREPONEMA PALLIDUM IGG+IGM AB [PRESENCE] IN SERUM OR PLASMA BY IMMUNOASSAY: NONREACTIVE
WBC # BLD AUTO: 12.47 K/UL (ref 3.9–12.7)

## 2024-10-08 PROCEDURE — 87389 HIV-1 AG W/HIV-1&-2 AB AG IA: CPT | Performed by: STUDENT IN AN ORGANIZED HEALTH CARE EDUCATION/TRAINING PROGRAM

## 2024-10-08 PROCEDURE — 85025 COMPLETE CBC W/AUTO DIFF WBC: CPT | Performed by: STUDENT IN AN ORGANIZED HEALTH CARE EDUCATION/TRAINING PROGRAM

## 2024-10-08 PROCEDURE — 36415 COLL VENOUS BLD VENIPUNCTURE: CPT | Performed by: STUDENT IN AN ORGANIZED HEALTH CARE EDUCATION/TRAINING PROGRAM

## 2024-10-08 PROCEDURE — 86593 SYPHILIS TEST NON-TREP QUANT: CPT | Performed by: STUDENT IN AN ORGANIZED HEALTH CARE EDUCATION/TRAINING PROGRAM

## 2024-10-15 ENCOUNTER — ROUTINE PRENATAL (OUTPATIENT)
Dept: OBSTETRICS AND GYNECOLOGY | Facility: CLINIC | Age: 19
End: 2024-10-15
Payer: MEDICAID

## 2024-10-15 ENCOUNTER — TELEPHONE (OUTPATIENT)
Dept: OBSTETRICS AND GYNECOLOGY | Facility: CLINIC | Age: 19
End: 2024-10-15

## 2024-10-15 VITALS
DIASTOLIC BLOOD PRESSURE: 88 MMHG | WEIGHT: 180.13 LBS | BODY MASS INDEX: 31.91 KG/M2 | SYSTOLIC BLOOD PRESSURE: 136 MMHG

## 2024-10-15 DIAGNOSIS — Z34.00 SUPERVISION OF NORMAL FIRST PREGNANCY, ANTEPARTUM: Primary | ICD-10-CM

## 2024-10-15 DIAGNOSIS — O47.1 FALSE LABOR AFTER 37 WEEKS OF GESTATION WITHOUT DELIVERY: ICD-10-CM

## 2024-10-15 DIAGNOSIS — Z03.79 OTHER SUSPECTED MATERNAL AND FETAL CONDITION NOT FOUND: ICD-10-CM

## 2024-10-15 PROCEDURE — 99212 OFFICE O/P EST SF 10 MIN: CPT | Mod: PBBFAC,TH | Performed by: STUDENT IN AN ORGANIZED HEALTH CARE EDUCATION/TRAINING PROGRAM

## 2024-10-15 PROCEDURE — 87653 STREP B DNA AMP PROBE: CPT | Performed by: STUDENT IN AN ORGANIZED HEALTH CARE EDUCATION/TRAINING PROGRAM

## 2024-10-15 PROCEDURE — 99999 PR PBB SHADOW E&M-EST. PATIENT-LVL II: CPT | Mod: PBBFAC,,, | Performed by: STUDENT IN AN ORGANIZED HEALTH CARE EDUCATION/TRAINING PROGRAM

## 2024-10-15 RX ORDER — FAMOTIDINE 20 MG/1
20 TABLET, FILM COATED ORAL 2 TIMES DAILY
Qty: 60 TABLET | Refills: 1 | Status: SHIPPED | OUTPATIENT
Start: 2024-10-15 | End: 2025-10-15

## 2024-10-15 NOTE — TELEPHONE ENCOUNTER
Called pt to let her know that she can report to the [facility] on Sat. Afternoon between 2-4 to get balloon inserted and then they will follow-up with NST.   Patient verbalized understanding

## 2024-10-15 NOTE — PROGRESS NOTES
HISTORY AND PHYSICAL                                                OBSTETRICS          Subjective:       Rojelio Duong is a 19 y.o.  female with IUP at 38w4d weeks gestation who presents for routine OB visit. Patient denies vaginal bleeding, contractions, LOF.   Fetal Movement: normal.    This IUP is complicated by rubella non immune, anemia.      PMHx: No past medical history on file.    PSHx: No past surgical history on file.    All: Review of patient's allergies indicates:  No Known Allergies    Meds: (Not in a hospital admission)      SH:   Social History     Socioeconomic History    Marital status: Single   Tobacco Use    Smoking status: Never    Smokeless tobacco: Never   Substance and Sexual Activity    Alcohol use: No    Drug use: No    Sexual activity: Yes     Partners: Male       FH:   Family History   Problem Relation Name Age of Onset    Diabetes Maternal Grandfather      Diabetes Paternal Grandfather      Hypertension Paternal Grandfather      Asthma Brother         OBHx:   OB History    Para Term  AB Living   1 0 0 0 0 0   SAB IAB Ectopic Multiple Live Births   0 0 0 0 0      # Outcome Date GA Lbr Yamil/2nd Weight Sex Type Anes PTL Lv   1 Current                Objective:       /88   Wt 81.7 kg (180 lb 1.9 oz)   BMI 31.91 kg/m²     Vitals:    10/15/24 0902   BP: 136/88   Weight: 81.7 kg (180 lb 1.9 oz)       General:   alert, appears stated age and cooperative, no apparent distress   HENT:  normocephalic, atraumatic   Eyes:  extraocular movements and conjunctivae normal   Neck:  supple, range of motion normal, no thyromegaly   Lungs:   no respiratory distress   Heart:   regular rate   Abdomen:  Nontender, gravid   Extremities positive edema, negative erythema   FHT: verified   Presentations: cephalic by ultrasound   Cervix: 0.5/50/-3    Consistency: soft    Position: middle     Recent Growth Scan: 32 weeks Fetal size is appropriate for gestational age, with the EFW (4516  g) plotting at the 27% and the AC plotting at the 60%.     Lab Review  Blood Type O POS  GBBS: pending  Rubella: Not immune  RPR: nonreactive  HIV: negative  HepB: negative    Lab Results   Component Value Date    WBC 12.47 10/08/2024    HGB 9.4 (L) 10/08/2024    HCT 28.9 (L) 10/08/2024    MCV 87 10/08/2024     10/08/2024            Assessment:       38w4d weeks gestation     There are no hospital problems to display for this patient.         Plan:      Risks, benefits, alternatives and possible complications have been discussed in detail with the patient.   - Consents in media   - Pt opts for outpatient balloon ripening  - Pt instructed to present to Labor and Delivery unit at the time of labor or at time given to her by the nurse that will phone her  - Being induction per cervical exam upon arrival  - Encouraged iron supplementation    Post-Partum Hemorrhage risk - medium    Margarita Agarwal M.D.

## 2024-10-17 LAB — GROUP B STREPTOCOCCUS, PCR: POSITIVE

## 2024-10-19 ENCOUNTER — PATIENT MESSAGE (OUTPATIENT)
Dept: OBSTETRICS AND GYNECOLOGY | Facility: OTHER | Age: 19
End: 2024-10-19
Payer: MEDICAID

## 2024-10-19 PROCEDURE — 4A0HXCZ MEASUREMENT OF PRODUCTS OF CONCEPTION, CARDIAC RATE, EXTERNAL APPROACH: ICD-10-PCS | Performed by: OBSTETRICS & GYNECOLOGY

## 2024-10-20 ENCOUNTER — HOSPITAL ENCOUNTER (INPATIENT)
Facility: OTHER | Age: 19
LOS: 3 days | Discharge: HOME | End: 2024-10-23
Attending: STUDENT IN AN ORGANIZED HEALTH CARE EDUCATION/TRAINING PROGRAM | Admitting: STUDENT IN AN ORGANIZED HEALTH CARE EDUCATION/TRAINING PROGRAM
Payer: MEDICAID

## 2024-10-20 ENCOUNTER — ANESTHESIA (OUTPATIENT)
Dept: OBSTETRICS AND GYNECOLOGY | Facility: OTHER | Age: 19
End: 2024-10-20
Payer: MEDICAID

## 2024-10-20 ENCOUNTER — ANESTHESIA EVENT (OUTPATIENT)
Dept: OBSTETRICS AND GYNECOLOGY | Facility: OTHER | Age: 19
End: 2024-10-20
Payer: MEDICAID

## 2024-10-20 DIAGNOSIS — Z34.00 SUPERVISION OF NORMAL FIRST PREGNANCY, ANTEPARTUM: ICD-10-CM

## 2024-10-20 DIAGNOSIS — Z34.90 ENCOUNTER FOR INDUCTION OF LABOR: ICD-10-CM

## 2024-10-20 LAB
ABO + RH BLD: NORMAL
BASOPHILS # BLD AUTO: 0.05 K/UL (ref 0–0.2)
BASOPHILS NFR BLD: 0.4 % (ref 0–1.9)
BLD GP AB SCN CELLS X3 SERPL QL: NORMAL
DIFFERENTIAL METHOD BLD: ABNORMAL
EOSINOPHIL # BLD AUTO: 0 K/UL (ref 0–0.5)
EOSINOPHIL NFR BLD: 0.3 % (ref 0–8)
ERYTHROCYTE [DISTWIDTH] IN BLOOD BY AUTOMATED COUNT: 14.7 % (ref 11.5–14.5)
HCT VFR BLD AUTO: 30.7 % (ref 37–48.5)
HGB BLD-MCNC: 10 G/DL (ref 12–16)
IMM GRANULOCYTES # BLD AUTO: 0.07 K/UL (ref 0–0.04)
IMM GRANULOCYTES NFR BLD AUTO: 0.5 % (ref 0–0.5)
LYMPHOCYTES # BLD AUTO: 2.4 K/UL (ref 1–4.8)
LYMPHOCYTES NFR BLD: 17.3 % (ref 18–48)
MCH RBC QN AUTO: 28 PG (ref 27–31)
MCHC RBC AUTO-ENTMCNC: 32.6 G/DL (ref 32–36)
MCV RBC AUTO: 86 FL (ref 82–98)
MONOCYTES # BLD AUTO: 0.9 K/UL (ref 0.3–1)
MONOCYTES NFR BLD: 6.5 % (ref 4–15)
NEUTROPHILS # BLD AUTO: 10.4 K/UL (ref 1.8–7.7)
NEUTROPHILS NFR BLD: 75 % (ref 38–73)
NRBC BLD-RTO: 0 /100 WBC
PLATELET # BLD AUTO: 184 K/UL (ref 150–450)
PMV BLD AUTO: 12.8 FL (ref 9.2–12.9)
RBC # BLD AUTO: 3.57 M/UL (ref 4–5.4)
TREPONEMA PALLIDUM IGG+IGM AB [PRESENCE] IN SERUM OR PLASMA BY IMMUNOASSAY: NONREACTIVE
WBC # BLD AUTO: 13.9 K/UL (ref 3.9–12.7)

## 2024-10-20 PROCEDURE — 63600175 PHARM REV CODE 636 W HCPCS

## 2024-10-20 PROCEDURE — 27000181 HC CABLE, IUPC

## 2024-10-20 PROCEDURE — 62326 NJX INTERLAMINAR LMBR/SAC: CPT

## 2024-10-20 PROCEDURE — 27200710 HC EPIDURAL INFUSION PUMP SET: Performed by: ANESTHESIOLOGY

## 2024-10-20 PROCEDURE — 86900 BLOOD TYPING SEROLOGIC ABO: CPT

## 2024-10-20 PROCEDURE — 86901 BLOOD TYPING SEROLOGIC RH(D): CPT

## 2024-10-20 PROCEDURE — 85025 COMPLETE CBC W/AUTO DIFF WBC: CPT

## 2024-10-20 PROCEDURE — 51702 INSERT TEMP BLADDER CATH: CPT

## 2024-10-20 PROCEDURE — 25000003 PHARM REV CODE 250

## 2024-10-20 PROCEDURE — 59409 OBSTETRICAL CARE: CPT | Mod: ,,, | Performed by: ANESTHESIOLOGY

## 2024-10-20 PROCEDURE — C1751 CATH, INF, PER/CENT/MIDLINE: HCPCS | Performed by: ANESTHESIOLOGY

## 2024-10-20 PROCEDURE — 86593 SYPHILIS TEST NON-TREP QUANT: CPT

## 2024-10-20 PROCEDURE — 11000001 HC ACUTE MED/SURG PRIVATE ROOM

## 2024-10-20 RX ORDER — OXYTOCIN 10 [USP'U]/ML
10 INJECTION, SOLUTION INTRAMUSCULAR; INTRAVENOUS
Status: DISCONTINUED | OUTPATIENT
Start: 2024-10-20 | End: 2024-10-21

## 2024-10-20 RX ORDER — TERBUTALINE SULFATE 1 MG/ML
INJECTION SUBCUTANEOUS
Status: DISCONTINUED
Start: 2024-10-20 | End: 2024-10-20 | Stop reason: WASHOUT

## 2024-10-20 RX ORDER — OXYTOCIN-SODIUM CHLORIDE 0.9% IV SOLN 30 UNIT/500ML 30-0.9/5 UT/ML-%
10 SOLUTION INTRAVENOUS ONCE AS NEEDED
Status: DISCONTINUED | OUTPATIENT
Start: 2024-10-20 | End: 2024-10-21

## 2024-10-20 RX ORDER — OXYTOCIN-SODIUM CHLORIDE 0.9% IV SOLN 30 UNIT/500ML 30-0.9/5 UT/ML-%
95 SOLUTION INTRAVENOUS ONCE AS NEEDED
Status: DISCONTINUED | OUTPATIENT
Start: 2024-10-20 | End: 2024-10-21

## 2024-10-20 RX ORDER — FAMOTIDINE 10 MG/ML
20 INJECTION INTRAVENOUS ONCE
OUTPATIENT
Start: 2024-10-20 | End: 2024-10-20

## 2024-10-20 RX ORDER — MISOPROSTOL 200 UG/1
800 TABLET ORAL ONCE AS NEEDED
Status: DISCONTINUED | OUTPATIENT
Start: 2024-10-20 | End: 2024-10-21

## 2024-10-20 RX ORDER — FENTANYL/BUPIVACAINE/NS/PF 2MCG/ML-.1
PLASTIC BAG, INJECTION (ML) INJECTION
Status: DISCONTINUED | OUTPATIENT
Start: 2024-10-20 | End: 2024-10-21

## 2024-10-20 RX ORDER — FENTANYL/BUPIVACAINE/NS/PF 2MCG/ML-.1
PLASTIC BAG, INJECTION (ML) INJECTION
Status: COMPLETED
Start: 2024-10-20 | End: 2024-10-20

## 2024-10-20 RX ORDER — OXYTOCIN-SODIUM CHLORIDE 0.9% IV SOLN 30 UNIT/500ML 30-0.9/5 UT/ML-%
0-32 SOLUTION INTRAVENOUS CONTINUOUS
Status: DISCONTINUED | OUTPATIENT
Start: 2024-10-20 | End: 2024-10-21

## 2024-10-20 RX ORDER — OXYTOCIN 10 [USP'U]/ML
10 INJECTION, SOLUTION INTRAMUSCULAR; INTRAVENOUS ONCE AS NEEDED
Status: DISCONTINUED | OUTPATIENT
Start: 2024-10-20 | End: 2024-10-21

## 2024-10-20 RX ORDER — SIMETHICONE 80 MG
1 TABLET,CHEWABLE ORAL 4 TIMES DAILY PRN
Status: DISCONTINUED | OUTPATIENT
Start: 2024-10-20 | End: 2024-10-21

## 2024-10-20 RX ORDER — OXYTOCIN-SODIUM CHLORIDE 0.9% IV SOLN 30 UNIT/500ML 30-0.9/5 UT/ML-%
95 SOLUTION INTRAVENOUS CONTINUOUS PRN
Status: CANCELLED | OUTPATIENT
Start: 2024-10-20

## 2024-10-20 RX ORDER — CALCIUM CARBONATE 200(500)MG
500 TABLET,CHEWABLE ORAL 3 TIMES DAILY PRN
Status: DISCONTINUED | OUTPATIENT
Start: 2024-10-20 | End: 2024-10-21

## 2024-10-20 RX ORDER — CARBOPROST TROMETHAMINE 250 UG/ML
250 INJECTION, SOLUTION INTRAMUSCULAR
Status: DISCONTINUED | OUTPATIENT
Start: 2024-10-20 | End: 2024-10-21

## 2024-10-20 RX ORDER — OXYTOCIN-SODIUM CHLORIDE 0.9% IV SOLN 30 UNIT/500ML 30-0.9/5 UT/ML-%
30 SOLUTION INTRAVENOUS ONCE AS NEEDED
Status: DISCONTINUED | OUTPATIENT
Start: 2024-10-20 | End: 2024-10-21

## 2024-10-20 RX ORDER — ONDANSETRON 8 MG/1
8 TABLET, ORALLY DISINTEGRATING ORAL EVERY 8 HOURS PRN
Status: DISCONTINUED | OUTPATIENT
Start: 2024-10-20 | End: 2024-10-21

## 2024-10-20 RX ORDER — DIPHENOXYLATE HYDROCHLORIDE AND ATROPINE SULFATE 2.5; .025 MG/1; MG/1
2 TABLET ORAL EVERY 6 HOURS PRN
Status: DISCONTINUED | OUTPATIENT
Start: 2024-10-20 | End: 2024-10-21

## 2024-10-20 RX ORDER — METHYLERGONOVINE MALEATE 0.2 MG/ML
200 INJECTION INTRAVENOUS ONCE AS NEEDED
Status: DISCONTINUED | OUTPATIENT
Start: 2024-10-20 | End: 2024-10-21

## 2024-10-20 RX ORDER — ACETAMINOPHEN 325 MG/1
650 TABLET ORAL EVERY 6 HOURS PRN
Status: DISCONTINUED | OUTPATIENT
Start: 2024-10-20 | End: 2024-10-21

## 2024-10-20 RX ORDER — LIDOCAINE HYDROCHLORIDE 10 MG/ML
10 INJECTION, SOLUTION INFILTRATION; PERINEURAL ONCE AS NEEDED
Status: DISCONTINUED | OUTPATIENT
Start: 2024-10-20 | End: 2024-10-21

## 2024-10-20 RX ORDER — TRANEXAMIC ACID 10 MG/ML
1000 INJECTION, SOLUTION INTRAVENOUS EVERY 30 MIN PRN
Status: DISCONTINUED | OUTPATIENT
Start: 2024-10-20 | End: 2024-10-21

## 2024-10-20 RX ORDER — SODIUM CHLORIDE 9 MG/ML
INJECTION, SOLUTION INTRAVENOUS
Status: DISCONTINUED | OUTPATIENT
Start: 2024-10-20 | End: 2024-10-21

## 2024-10-20 RX ORDER — SODIUM CITRATE AND CITRIC ACID MONOHYDRATE 334; 500 MG/5ML; MG/5ML
30 SOLUTION ORAL ONCE
OUTPATIENT
Start: 2024-10-20 | End: 2024-10-20

## 2024-10-20 RX ORDER — LIDOCAINE HYDROCHLORIDE AND EPINEPHRINE 15; 5 MG/ML; UG/ML
INJECTION, SOLUTION EPIDURAL
Status: DISCONTINUED | OUTPATIENT
Start: 2024-10-20 | End: 2024-10-21

## 2024-10-20 RX ORDER — CEFAZOLIN 2 G/1
2 INJECTION, POWDER, FOR SOLUTION INTRAMUSCULAR; INTRAVENOUS ONCE AS NEEDED
Status: DISCONTINUED | OUTPATIENT
Start: 2024-10-20 | End: 2024-10-21

## 2024-10-20 RX ORDER — SODIUM CHLORIDE, SODIUM LACTATE, POTASSIUM CHLORIDE, CALCIUM CHLORIDE 600; 310; 30; 20 MG/100ML; MG/100ML; MG/100ML; MG/100ML
INJECTION, SOLUTION INTRAVENOUS CONTINUOUS
Status: DISCONTINUED | OUTPATIENT
Start: 2024-10-20 | End: 2024-10-21

## 2024-10-20 RX ORDER — FENTANYL/BUPIVACAINE/NS/PF 2MCG/ML-.1
PLASTIC BAG, INJECTION (ML) INJECTION CONTINUOUS
Status: DISCONTINUED | OUTPATIENT
Start: 2024-10-20 | End: 2024-10-21

## 2024-10-20 RX ADMIN — Medication 4 MILLI-UNITS/MIN: at 05:10

## 2024-10-20 RX ADMIN — DEXTROSE MONOHYDRATE 3 MILLION UNITS: 50 INJECTION, SOLUTION INTRAVENOUS at 09:10

## 2024-10-20 RX ADMIN — FENTANYL CITRATE 5 ML: 50 INJECTION INTRAMUSCULAR; INTRAVENOUS at 08:10

## 2024-10-20 RX ADMIN — DEXTROSE MONOHYDRATE 3 MILLION UNITS: 50 INJECTION, SOLUTION INTRAVENOUS at 05:10

## 2024-10-20 RX ADMIN — MISOPROSTOL 50 MCG: 100 TABLET ORAL at 01:10

## 2024-10-20 RX ADMIN — ONDANSETRON 8 MG: 8 TABLET, ORALLY DISINTEGRATING ORAL at 01:10

## 2024-10-20 RX ADMIN — DEXTROSE MONOHYDRATE 3 MILLION UNITS: 50 INJECTION, SOLUTION INTRAVENOUS at 01:10

## 2024-10-20 RX ADMIN — SODIUM CHLORIDE, POTASSIUM CHLORIDE, SODIUM LACTATE AND CALCIUM CHLORIDE 500 ML: 600; 310; 30; 20 INJECTION, SOLUTION INTRAVENOUS at 08:10

## 2024-10-20 RX ADMIN — FENTANYL CITRATE 8 ML/HR: 50 INJECTION INTRAMUSCULAR; INTRAVENOUS at 08:10

## 2024-10-20 RX ADMIN — DEXTROSE MONOHYDRATE 3 MILLION UNITS: 50 INJECTION, SOLUTION INTRAVENOUS at 10:10

## 2024-10-20 RX ADMIN — DEXTROSE MONOHYDRATE 5 MILLION UNITS: 5 INJECTION INTRAVENOUS at 01:10

## 2024-10-20 RX ADMIN — LIDOCAINE HYDROCHLORIDE,EPINEPHRINE BITARTRATE 3 ML: 15; .005 INJECTION, SOLUTION EPIDURAL; INFILTRATION; INTRACAUDAL; PERINEURAL at 08:10

## 2024-10-20 NOTE — PROGRESS NOTES
10/20/24 1018   Tele[facility] Note - Strip   Strip Reviewed by Justin Nurse? Yes   [facility] Note - Communication   Elsa Nurse Communicated with Bedside Nurse Regarding: Fetal Status   [facility] Note - Notification   Nurse Notified? Yes   Name of Nurse Carey at desk - will verify staff at bedside

## 2024-10-20 NOTE — H&P
HISTORY AND PHYSICAL                                                OBSTETRICS          Subjective:       Rojelio Duong is a 19 y.o.  female with IUP at 39w2d weeks gestation who presents for elective IOL.    Patient reports irregular contractions, denies vaginal bleeding, denies LOF.   Fetal Movement: normal.    This IUP is complicated by GBS+, RNI, Anemia and Elevated BP earlier today after cervical ripening balloon placement.    Review of Systems   Constitutional:  Negative for fever.   Respiratory:  Negative for shortness of breath.    Cardiovascular:  Negative for chest pain.   Gastrointestinal:  Positive for abdominal pain (irregular contractions). Negative for nausea and vomiting.   Endocrine: Negative for hot flashes.   Genitourinary:  Negative for menstrual problem.   Integumentary:  Negative for breast mass, nipple discharge and breast skin changes.   Neurological:  Negative for headaches.   Hematological:  Does not bruise/bleed easily.   Psychiatric/Behavioral:  Negative for depression.    Breast: Negative for mass, mastodynia, nipple discharge and skin changes      PMHx: No past medical history on file.    PSHx: No past surgical history on file.    All: Review of patient's allergies indicates:  No Known Allergies    Meds:   Medications Prior to Admission   Medication Sig Dispense Refill Last Dose/Taking    acetaminophen (TYLENOL) 500 MG tablet Take 1 tablet (500 mg total) by mouth every 4 (four) hours as needed for Pain or Temperature greater than (100.5 or greater). 30 tablet 0     aspirin (ECOTRIN) 81 MG EC tablet Take 1 tablet (81 mg total) by mouth once daily. (Patient not taking: Reported on 10/15/2024) 150 tablet 2     cetirizine (ZYRTEC) 10 MG tablet Take 1 tablet (10 mg total) by mouth once daily. 30 tablet 0     clobetasol 0.05% (TEMOVATE) 0.05 % Oint Apply topically nightly.       dextroamphetamine-amphetamine (ADDERALL XR) 10 MG 24 hr capsule Take 1 capsule by mouth every morning  (Patient not taking: Reported on 10/15/2024) 30 capsule 0     famotidine (PEPCID) 20 MG tablet Take 1 tablet (20 mg total) by mouth 2 (two) times daily. 60 tablet 1     fluticasone propionate (FLONASE) 50 mcg/actuation nasal spray 1 spray (50 mcg total) by Each Nostril route once daily. 9.9 mL 0     hydrocortisone (WESTCORT) 0.2 % cream Apply to eczema bid 60 g 5     loratadine (CLARITIN) 5 mg/5 mL syrup Take two teaspoons (10 ml) by mouth daily as needed for congestion 240 mL 2     mometasone 0.1% (ELOCON) 0.1 % cream Apply to affected area daily 45 g 1     phenoL (CHLORASEPTIC THROAT SPRAY) 1.4 % SprA by Mucous Membrane route every 2 (two) hours. 177 mL 0     prenatal no115/iron/folic acid (PRENATAL 19 ORAL) Take by mouth.       triamcinolone acetonide 0.1% (KENALOG) 0.1 % cream Apply topically 3 (three) times daily as needed. 80 g 1        SH:   Social History     Socioeconomic History    Marital status: Single   Tobacco Use    Smoking status: Never    Smokeless tobacco: Never   Substance and Sexual Activity    Alcohol use: No    Drug use: No    Sexual activity: Yes     Partners: Male       FH:   Family History   Problem Relation Name Age of Onset    Diabetes Maternal Grandfather      Diabetes Paternal Grandfather      Hypertension Paternal Grandfather      Asthma Brother         OBHx:   OB History    Para Term  AB Living   1 0 0 0 0 0   SAB IAB Ectopic Multiple Live Births   0 0 0 0 0      # Outcome Date GA Lbr Yamil/2nd Weight Sex Type Anes PTL Lv   1 Current                Objective:       /82   Pulse 84   Temp 98.2 °F (36.8 °C) (Oral)   Resp 16   SpO2 100%   Breastfeeding No     Vitals:    10/20/24 0040 10/20/24 0100   BP: 120/71 130/82   Pulse: 75 84   Resp: 16    Temp: 98.2 °F (36.8 °C)    TempSrc: Oral    SpO2: 100% 100%       General:   alert, appears stated age and cooperative, no apparent distress   HENT:  normocephalic, atraumatic   Eyes:  extraocular movements and conjunctivae  normal   Neck:  supple, range of motion normal, no thyromegaly   Lungs:   no respiratory distress   Heart:   regular rate   Abdomen:  soft, non-tender, non-distended but gravid, no rebound or guarding    Extremities negative edema, negative erythema   FHT: 145, moderate BTBV, +accels, -decels;  Cat 1 (reassuring)                 TOCO: Q 7 minutes   Presentations: cephalic by ultrasound   Cervix:     Dilation: 3cm    Effacement: 50%    Station:  -3    Consistency: medium    Position: posterior   Sterile Speculum Exam: deferred    EFW by Leopold's: 6.5    Recent Growth Scan: 2456g EFW 27%, AC 60% @34w5d      Lab Review  Blood Type O POS  GBBS: positive  Rubella: Not immune  Trep: Non-reactive  HIV: negative  HepB: negative       Assessment:       39w2d weeks gestation with     Active Hospital Problems    Diagnosis  POA    *Encounter for induction of labor [Z34.90]  Not Applicable      Resolved Hospital Problems   No resolved problems to display.          Plan:   IOL   Risks, benefits, alternatives and possible complications have been discussed in detail with the patient.   - Consents signed and to chart  - Admit to Labor and Delivery unit   - Epidural per Anesthesia  - Draw CBC, T&S  - Notify Staff  - Recheck in 4 hrs or PRN  - EFW 27%  - Maternal pelvis unproven   - Patient had outpatient cervical ripening balloon placed at 4PM that came out on admission for IOL. On SVE cervix felt to be a tight 3cm, long and very posterior. Patient would benefit from further cervical ripening. Will give dose of cycotec,     Elevated BP  - Patient had multiple mild range BP today in [facility] following cervical ripening balloon placement.   -PreE labs normal  -Patient asymptomatic   -Continue to monitor BP    GBS Positive   - PCN per protocol     Rubella nonimmune  - Will need MMR postpartum    Anemia   - H/H as above  - asymptomatic  - iron/colace    Contraception: Patch    Post-Partum Hemorrhage risk - low    Miriam Pickens  MD  [facility]   OBGYN PGY3

## 2024-10-20 NOTE — PROGRESS NOTES
"LABOR NOTE    S:  Complaints: No.  Epidural working:  working well   Resident to bedside for cervical exam     O: /63   Pulse 100   Temp 98.3 °F (36.8 °C) (Oral)   Resp 16   Ht 5' 3" (1.6 m)   Wt 81.6 kg (180 lb)   SpO2 99%   Breastfeeding No   BMI 31.89 kg/m²     FHT: 130 bpm, mod variability, + accels, - decels Cat 1 (reassuring)  CTX: q 1-3 minutes, pit @ 16  SVE: 4/70/-2     TIMELINE:  0115: 3/60/-3, vivar out, cytotec x1 given  0530: 3/60/-3, start pitocin  0930: 3/50/-3, AROM at next  check   1315: 4/70/-2, AROM mec  1715: 4/70/-2, IUPC declined, wants IUPC at next check if unchanged    PLAN:  Continue Close Maternal/Fetal Monitoring  Pitocin Augmentation per protocol  AROM Next check   Recheck 4 hours or PRN  PCN for GBS    Bianca Clancy MD  Obstetrics & Gynecology, PGY-1    "

## 2024-10-20 NOTE — ANESTHESIA PROCEDURE NOTES
Epidural    Patient location during procedure: OB   Reason for block: primary anesthetic   Reason for block: labor analgesia requested by patient and obstetrician   Start time: 10/20/2024 8:19 AM  Timeout: 10/20/2024 8:18 AM  End time: 10/20/2024 8:28 AM  Surgery related to: Vaginal Delivery    Staffing  Performing Provider: Shae Ricardo MD  Authorizing Provider: Garrett Nelson III, MD    Staffing  Performed by: Shae Ricardo MD  Authorized by: Garrett Nelson III, MD        Preanesthetic Checklist  Completed: patient identified, IV checked, site marked, risks and benefits discussed, surgical consent, monitors and equipment checked, pre-op evaluation, timeout performed, anesthesia consent given, hand hygiene performed and patient being monitored  Preparation  Patient position: sitting  Prep: ChloraPrep  Patient monitoring: Pulse Ox  Reason for block: primary anesthetic   Epidural  Skin Anesthetic: lidocaine 1%  Skin Wheal: 3 mL  Administration type: continuous  Approach: midline  Interspace: L3-4    Injection technique: SHEFALI saline  Needle and Epidural Catheter  Needle type: Tuohy   Needle gauge: 17  Needle length: 3.5 inches  Needle insertion depth: 7 cm  Catheter type: springwound  Catheter size: 19 G  Catheter at skin depth: 11 cm  Insertion Attempts: 1  Test dose: 3 mL of lidocaine 1.5% with Epi 1-to-200,000  Additional Documentation: incremental injection, negative aspiration for heme and CSF, no paresthesia on injection, no signs/symptoms of IV or SA injection, no significant pain on injection and no significant complaints from patient  Needle localization: anatomical landmarks  Medications:  Volume per aspiration: 5 mL  Time between aspirations: 5 minutes   Assessment  Ease of block: easy  Patient's tolerance of the procedure: comfortable throughout block and no complaints

## 2024-10-20 NOTE — ANESTHESIA PREPROCEDURE EVALUATION
"[facility]  Anesthesia Pre-Operative Evaluation         Patient Name: Rojelio Duong  YOB: 2005  MRN: [id number]    10/20/2024      Rojelio Duong is a 19 y.o. female  @ 39w2d who presents for IOL. IUP c/b RNI, GBS+, and anemia (Hct 28).    Denies cardiopulmonary disease, coagulopathies, and spinal pathologies.     OB History    Para Term  AB Living   1             SAB IAB Ectopic Multiple Live Births                  # Outcome Date GA Lbr Yamil/2nd Weight Sex Type Anes PTL Lv   1 Current                Review of patient's allergies indicates:  No Known Allergies    Wt Readings from Last 1 Encounters:   10/15/24 0902 81.7 kg (180 lb 1.9 oz)       BP Readings from Last 3 Encounters:   10/19/24 (!) 134/95   10/15/24 136/88   09/10/24 118/68       Patient Active Problem List   Diagnosis    Positive pregnancy test    Supervision of normal first pregnancy, antepartum    Encounter for induction of labor       No past surgical history on file.    Social History     Socioeconomic History    Marital status: Single   Tobacco Use    Smoking status: Never    Smokeless tobacco: Never   Substance and Sexual Activity    Alcohol use: No    Drug use: No    Sexual activity: Yes     Partners: Male         Chemistry        Component Value Date/Time     (L) 10/19/2024 1632    K 3.5 10/19/2024 1632     10/19/2024 1632    CO2 17 (L) 10/19/2024 1632    BUN 7 10/19/2024 1632    CREATININE 0.9 10/19/2024 1632    GLU 79 10/19/2024 1632        Component Value Date/Time    CALCIUM 8.8 10/19/2024 1632    ALKPHOS 166 (H) 10/19/2024 1632    AST 21 10/19/2024 1632    ALT 14 10/19/2024 1632    BILITOT 0.4 10/19/2024 1632            Lab Results   Component Value Date    WBC 12.33 10/19/2024    HGB 9.1 (L) 10/19/2024    HCT 28.3 (L) 10/19/2024    MCV 86 10/19/2024     10/19/2024       No results for input(s): "PT", "INR", "PROTIME", "APTT" in the last 72 hours.      Pre-op " Assessment    I have reviewed the Patient Summary Reports.     I have reviewed the Nursing Notes.    I have reviewed the Medications.     Review of Systems  Anesthesia Hx:  No problems with previous Anesthesia             Denies Family Hx of Anesthesia complications.    Denies Personal Hx of Anesthesia complications.                    Hematology/Oncology:    Oncology Normal    -- Anemia:                                  EENT/Dental:  EENT/Dental Normal           Cardiovascular:  Cardiovascular Normal     Denies Hypertension.        Denies CHF.                                   Pulmonary:  Pulmonary Normal    Denies Asthma.                    Renal/:  Renal/ Normal                 Hepatic/GI:  Hepatic/GI Normal                    Musculoskeletal:  Musculoskeletal Normal                Neurological:  Neurology Normal                                      Endocrine:  Endocrine Normal Denies Diabetes.           Dermatological:  Skin Normal    Psych:  Psychiatric Normal                    Physical Exam  General: Well nourished, Alert, Cooperative and Oriented    Airway:  Mallampati: II   Mouth Opening: Normal  TM Distance: Normal  Tongue: Normal  Neck ROM: Normal ROM    Dental:  Intact        Anesthesia Plan  Type of Anesthesia, risks & benefits discussed:    Anesthesia Type: Gen ETT, Epidural, CSE, Spinal  Intra-op Monitoring Plan: Standard ASA Monitors  Post Op Pain Control Plan: multimodal analgesia, IV/PO Opioids PRN and epidural analgesia  Induction:  IV  Airway Plan: Direct, Post-Induction  Informed Consent: Informed consent signed with the Patient and all parties understand the risks and agree with anesthesia plan.  All questions answered.   ASA Score: 2  Day of Surgery Review of History & Physical: H&P Update referred to the surgeon/provider.    Ready For Surgery From Anesthesia Perspective.     .

## 2024-10-20 NOTE — CARE UPDATE
FHTS decel to 50's at 1014. Resident to bedside at 1015. Maternal repositioning, pitocin paused, and bolus initiated. SVE 3/60/-3. At 1018 Terb called for 1019 moved to OR. Once in OR FHTs in 150's. Patient moved back to L&D room. Plan to keep pitocin paused for at least 30 minutes and then restart at lowest dose.     Savannah Hidalgo MD  Obstetrics and Gynecology, PGY-2

## 2024-10-20 NOTE — PROGRESS NOTES
"LABOR NOTE    S:  Complaints: No.  Epidural working:  working well   Resident to bedside for cervical exam     O: BP (!) 118/58   Pulse 70   Temp 98.2 °F (36.8 °C) (Oral)   Resp 16   Ht 5' 3" (1.6 m)   Wt 81.6 kg (180 lb)   SpO2 99%   Breastfeeding No   BMI 31.89 kg/m²     FHT: 145bpm, mod variability, + accels, - decels Cat 1 (reassuring)  CTX: q 4 minutes, pit at 4  SVE: 3/60/-3    TIMELINE:  0115: 3/60/-3, vivar out, cytotec x1 given  0530: 3/60/-3, start pitocin  0930: 3/500/-3, AROM at nect  check     PLAN:  Continue Close Maternal/Fetal Monitoring  Pitocin Augmentation per protocol  AROM Next check   Recheck 4 hours or PRN  PCN for GBS    Zakiya Means MD PGY-3  Obstetrics and Gynecology  [facility]          "

## 2024-10-21 PROCEDURE — 3E0DXGC INTRODUCTION OF OTHER THERAPEUTIC SUBSTANCE INTO MOUTH AND PHARYNX, EXTERNAL APPROACH: ICD-10-PCS | Performed by: STUDENT IN AN ORGANIZED HEALTH CARE EDUCATION/TRAINING PROGRAM

## 2024-10-21 PROCEDURE — 0DQR0ZZ REPAIR ANAL SPHINCTER, OPEN APPROACH: ICD-10-PCS | Performed by: STUDENT IN AN ORGANIZED HEALTH CARE EDUCATION/TRAINING PROGRAM

## 2024-10-21 PROCEDURE — 59409 OBSTETRICAL CARE: CPT | Mod: GB,,, | Performed by: STUDENT IN AN ORGANIZED HEALTH CARE EDUCATION/TRAINING PROGRAM

## 2024-10-21 PROCEDURE — 25000003 PHARM REV CODE 250

## 2024-10-21 PROCEDURE — 63600175 PHARM REV CODE 636 W HCPCS

## 2024-10-21 PROCEDURE — 10907ZC DRAINAGE OF AMNIOTIC FLUID, THERAPEUTIC FROM PRODUCTS OF CONCEPTION, VIA NATURAL OR ARTIFICIAL OPENING: ICD-10-PCS | Performed by: STUDENT IN AN ORGANIZED HEALTH CARE EDUCATION/TRAINING PROGRAM

## 2024-10-21 PROCEDURE — 51702 INSERT TEMP BLADDER CATH: CPT

## 2024-10-21 PROCEDURE — 11000001 HC ACUTE MED/SURG PRIVATE ROOM

## 2024-10-21 PROCEDURE — 3E033VJ INTRODUCTION OF OTHER HORMONE INTO PERIPHERAL VEIN, PERCUTANEOUS APPROACH: ICD-10-PCS | Performed by: STUDENT IN AN ORGANIZED HEALTH CARE EDUCATION/TRAINING PROGRAM

## 2024-10-21 RX ORDER — CARBOPROST TROMETHAMINE 250 UG/ML
250 INJECTION, SOLUTION INTRAMUSCULAR
Status: DISCONTINUED | OUTPATIENT
Start: 2024-10-21 | End: 2024-10-23 | Stop reason: HOSPADM

## 2024-10-21 RX ORDER — DIPHENHYDRAMINE HCL 25 MG
25 CAPSULE ORAL EVERY 4 HOURS PRN
Status: DISCONTINUED | OUTPATIENT
Start: 2024-10-21 | End: 2024-10-23 | Stop reason: HOSPADM

## 2024-10-21 RX ORDER — METHYLERGONOVINE MALEATE 0.2 MG/ML
200 INJECTION INTRAVENOUS ONCE AS NEEDED
Status: DISCONTINUED | OUTPATIENT
Start: 2024-10-21 | End: 2024-10-23 | Stop reason: HOSPADM

## 2024-10-21 RX ORDER — OXYTOCIN-SODIUM CHLORIDE 0.9% IV SOLN 30 UNIT/500ML 30-0.9/5 UT/ML-%
10 SOLUTION INTRAVENOUS ONCE AS NEEDED
Status: DISCONTINUED | OUTPATIENT
Start: 2024-10-21 | End: 2024-10-23 | Stop reason: HOSPADM

## 2024-10-21 RX ORDER — MISOPROSTOL 200 UG/1
800 TABLET ORAL ONCE AS NEEDED
Status: DISCONTINUED | OUTPATIENT
Start: 2024-10-21 | End: 2024-10-23 | Stop reason: HOSPADM

## 2024-10-21 RX ORDER — PRENATAL WITH FERROUS FUM AND FOLIC ACID 3080; 920; 120; 400; 22; 1.84; 3; 20; 10; 1; 12; 200; 27; 25; 2 [IU]/1; [IU]/1; MG/1; [IU]/1; MG/1; MG/1; MG/1; MG/1; MG/1; MG/1; UG/1; MG/1; MG/1; MG/1; MG/1
1 TABLET ORAL DAILY
Status: DISCONTINUED | OUTPATIENT
Start: 2024-10-21 | End: 2024-10-23 | Stop reason: HOSPADM

## 2024-10-21 RX ORDER — DOCUSATE SODIUM 100 MG/1
200 CAPSULE, LIQUID FILLED ORAL 2 TIMES DAILY PRN
Status: DISCONTINUED | OUTPATIENT
Start: 2024-10-21 | End: 2024-10-21

## 2024-10-21 RX ORDER — SODIUM CHLORIDE 0.9 % (FLUSH) 0.9 %
10 SYRINGE (ML) INJECTION
Status: DISCONTINUED | OUTPATIENT
Start: 2024-10-21 | End: 2024-10-23 | Stop reason: HOSPADM

## 2024-10-21 RX ORDER — SIMETHICONE 80 MG
1 TABLET,CHEWABLE ORAL EVERY 6 HOURS PRN
Status: DISCONTINUED | OUTPATIENT
Start: 2024-10-21 | End: 2024-10-23 | Stop reason: HOSPADM

## 2024-10-21 RX ORDER — ACETAMINOPHEN 325 MG/1
650 TABLET ORAL EVERY 6 HOURS
Qty: 30 TABLET | Refills: 1 | Status: SHIPPED | OUTPATIENT
Start: 2024-10-21

## 2024-10-21 RX ORDER — DOCUSATE SODIUM 100 MG/1
200 CAPSULE, LIQUID FILLED ORAL 2 TIMES DAILY
Status: DISCONTINUED | OUTPATIENT
Start: 2024-10-21 | End: 2024-10-23 | Stop reason: HOSPADM

## 2024-10-21 RX ORDER — POLYETHYLENE GLYCOL 3350 17 G/17G
17 POWDER, FOR SOLUTION ORAL DAILY
Status: DISCONTINUED | OUTPATIENT
Start: 2024-10-21 | End: 2024-10-23 | Stop reason: HOSPADM

## 2024-10-21 RX ORDER — ONDANSETRON 8 MG/1
8 TABLET, ORALLY DISINTEGRATING ORAL EVERY 8 HOURS PRN
Status: DISCONTINUED | OUTPATIENT
Start: 2024-10-21 | End: 2024-10-23 | Stop reason: HOSPADM

## 2024-10-21 RX ORDER — HYDROCORTISONE 25 MG/G
CREAM TOPICAL 3 TIMES DAILY PRN
Status: DISCONTINUED | OUTPATIENT
Start: 2024-10-21 | End: 2024-10-23 | Stop reason: HOSPADM

## 2024-10-21 RX ORDER — FENTANYL/BUPIVACAINE/NS/PF 2MCG/ML-.1
PLASTIC BAG, INJECTION (ML) INJECTION
Status: COMPLETED
Start: 2024-10-21 | End: 2024-10-21

## 2024-10-21 RX ORDER — ONDANSETRON HYDROCHLORIDE 2 MG/ML
4 INJECTION, SOLUTION INTRAVENOUS ONCE
Status: COMPLETED | OUTPATIENT
Start: 2024-10-21 | End: 2024-10-21

## 2024-10-21 RX ORDER — CEFOXITIN 2 G/1
2 INJECTION, POWDER, FOR SOLUTION INTRAVENOUS ONCE
Status: COMPLETED | OUTPATIENT
Start: 2024-10-21 | End: 2024-10-21

## 2024-10-21 RX ORDER — TRANEXAMIC ACID 10 MG/ML
1000 INJECTION, SOLUTION INTRAVENOUS EVERY 30 MIN PRN
Status: DISCONTINUED | OUTPATIENT
Start: 2024-10-21 | End: 2024-10-23 | Stop reason: HOSPADM

## 2024-10-21 RX ORDER — DIPHENOXYLATE HYDROCHLORIDE AND ATROPINE SULFATE 2.5; .025 MG/1; MG/1
2 TABLET ORAL EVERY 6 HOURS PRN
Status: DISCONTINUED | OUTPATIENT
Start: 2024-10-21 | End: 2024-10-23 | Stop reason: HOSPADM

## 2024-10-21 RX ORDER — DIPHENHYDRAMINE HYDROCHLORIDE 50 MG/ML
25 INJECTION INTRAMUSCULAR; INTRAVENOUS EVERY 4 HOURS PRN
Status: DISCONTINUED | OUTPATIENT
Start: 2024-10-21 | End: 2024-10-23 | Stop reason: HOSPADM

## 2024-10-21 RX ORDER — DOCUSATE SODIUM 100 MG/1
200 CAPSULE, LIQUID FILLED ORAL 2 TIMES DAILY
Qty: 120 CAPSULE | Refills: 0 | Status: SHIPPED | OUTPATIENT
Start: 2024-10-21

## 2024-10-21 RX ORDER — OXYTOCIN 10 [USP'U]/ML
10 INJECTION, SOLUTION INTRAMUSCULAR; INTRAVENOUS ONCE AS NEEDED
Status: COMPLETED | OUTPATIENT
Start: 2024-10-21 | End: 2024-10-21

## 2024-10-21 RX ORDER — IBUPROFEN 600 MG/1
600 TABLET ORAL EVERY 6 HOURS
Status: DISCONTINUED | OUTPATIENT
Start: 2024-10-21 | End: 2024-10-23 | Stop reason: HOSPADM

## 2024-10-21 RX ORDER — OXYTOCIN-SODIUM CHLORIDE 0.9% IV SOLN 30 UNIT/500ML 30-0.9/5 UT/ML-%
95 SOLUTION INTRAVENOUS ONCE AS NEEDED
Status: DISCONTINUED | OUTPATIENT
Start: 2024-10-21 | End: 2024-10-23 | Stop reason: HOSPADM

## 2024-10-21 RX ORDER — OXYTOCIN-SODIUM CHLORIDE 0.9% IV SOLN 30 UNIT/500ML 30-0.9/5 UT/ML-%
95 SOLUTION INTRAVENOUS CONTINUOUS PRN
Status: DISCONTINUED | OUTPATIENT
Start: 2024-10-21 | End: 2024-10-23 | Stop reason: HOSPADM

## 2024-10-21 RX ORDER — OXYCODONE HYDROCHLORIDE 5 MG/1
5 TABLET ORAL EVERY 6 HOURS PRN
Status: DISCONTINUED | OUTPATIENT
Start: 2024-10-21 | End: 2024-10-23 | Stop reason: HOSPADM

## 2024-10-21 RX ORDER — OXYCODONE HYDROCHLORIDE 10 MG/1
10 TABLET ORAL EVERY 6 HOURS PRN
Status: DISCONTINUED | OUTPATIENT
Start: 2024-10-21 | End: 2024-10-23 | Stop reason: HOSPADM

## 2024-10-21 RX ORDER — IBUPROFEN 600 MG/1
600 TABLET ORAL EVERY 6 HOURS
Qty: 30 TABLET | Refills: 1 | Status: SHIPPED | OUTPATIENT
Start: 2024-10-22

## 2024-10-21 RX ORDER — ACETAMINOPHEN 325 MG/1
650 TABLET ORAL EVERY 6 HOURS SCHEDULED
Status: DISCONTINUED | OUTPATIENT
Start: 2024-10-21 | End: 2024-10-23 | Stop reason: HOSPADM

## 2024-10-21 RX ADMIN — ONDANSETRON 4 MG: 2 INJECTION INTRAMUSCULAR; INTRAVENOUS at 06:10

## 2024-10-21 RX ADMIN — OXYTOCIN 10 UNITS: 10 INJECTION, SOLUTION INTRAMUSCULAR; INTRAVENOUS at 11:10

## 2024-10-21 RX ADMIN — IBUPROFEN 600 MG: 600 TABLET, FILM COATED ORAL at 04:10

## 2024-10-21 RX ADMIN — DEXTROSE MONOHYDRATE 3 MILLION UNITS: 50 INJECTION, SOLUTION INTRAVENOUS at 01:10

## 2024-10-21 RX ADMIN — DEXTROSE MONOHYDRATE 3 MILLION UNITS: 50 INJECTION, SOLUTION INTRAVENOUS at 05:10

## 2024-10-21 RX ADMIN — ACETAMINOPHEN 650 MG: 325 TABLET, FILM COATED ORAL at 04:10

## 2024-10-21 RX ADMIN — Medication 95 MILLI-UNITS/MIN: at 09:10

## 2024-10-21 RX ADMIN — FENTANYL CITRATE 8 ML/HR: 50 INJECTION INTRAMUSCULAR; INTRAVENOUS at 06:10

## 2024-10-21 RX ADMIN — ACETAMINOPHEN 650 MG: 325 TABLET, FILM COATED ORAL at 09:10

## 2024-10-21 RX ADMIN — IBUPROFEN 600 MG: 600 TABLET, FILM COATED ORAL at 09:10

## 2024-10-21 RX ADMIN — OXYCODONE HYDROCHLORIDE 10 MG: 10 TABLET ORAL at 05:10

## 2024-10-21 RX ADMIN — POLYETHYLENE GLYCOL 3350 17 G: 17 POWDER, FOR SOLUTION ORAL at 09:10

## 2024-10-21 RX ADMIN — OXYCODONE HYDROCHLORIDE 10 MG: 10 TABLET ORAL at 09:10

## 2024-10-21 RX ADMIN — CEFOXITIN 2 G: 2 INJECTION, POWDER, FOR SOLUTION INTRAVENOUS at 12:10

## 2024-10-21 RX ADMIN — Medication 2 MILLI-UNITS/MIN: at 03:10

## 2024-10-21 RX ADMIN — Medication 4 MILLI-UNITS/MIN: at 06:10

## 2024-10-21 RX ADMIN — DOCUSATE SODIUM 200 MG: 100 CAPSULE, LIQUID FILLED ORAL at 09:10

## 2024-10-21 NOTE — PLAN OF CARE
Pt free from falls, injury or any further trauma during this time. VSS. Fundus midline and firm, moderate lochia rubra. Continued medications as ordered. Pt had a Tmax of 100.8. Pain adequately controlled with medications. Assistance with feeding provided as needed. Pt in no distress. Plan of care ongoing.   Problem:  Fall Injury Risk  Goal: Absence of Fall, Infant Drop and Related Injury  Outcome: Progressing     Problem: Adult Inpatient Plan of Care  Goal: Plan of Care Review  Outcome: Progressing  Goal: Patient-Specific Goal (Individualized)  Outcome: Progressing  Goal: Absence of Hospital-Acquired Illness or Injury  Outcome: Progressing  Goal: Optimal Comfort and Wellbeing  Outcome: Progressing  Goal: Readiness for Transition of Care  Outcome: Progressing     Problem: Infection  Goal: Absence of Infection Signs and Symptoms  Outcome: Progressing     Problem: Labor  Goal: Hemostasis  Outcome: Progressing  Goal: Stable Fetal Wellbeing  Outcome: Progressing  Goal: Effective Progression to Delivery  Outcome: Progressing  Goal: Absence of Infection Signs and Symptoms  Outcome: Progressing  Goal: Acceptable Pain Control  Outcome: Progressing  Goal: Normal Uterine Contraction Pattern  Outcome: Progressing     Problem: Skin Injury Risk Increased  Goal: Skin Health and Integrity  Outcome: Progressing     Problem: Anesthesia/Analgesia, Neuraxial  Goal: Safe, Effective Infusion Delivery  Outcome: Progressing  Goal: Stable Patient-Fetal Status  Outcome: Progressing  Goal: Absence of Infection Signs and Symptoms  Outcome: Progressing  Goal: Nausea and Vomiting Relief  Outcome: Progressing  Goal: Effective Pain Control  Outcome: Progressing  Goal: Effective Oxygenation and Ventilation  Outcome: Progressing  Goal: Baseline Motor Function Return  Outcome: Progressing  Goal: Effective Urinary Elimination  Outcome: Progressing     Problem: Breastfeeding  Goal: Effective Breastfeeding  Outcome: Progressing

## 2024-10-21 NOTE — L&D DELIVERY NOTE
"[facility] - Labor & Delivery  Vaginal Delivery   Operative Note    SUMMARY     Normal spontaneous vaginal delivery of live infant after 2 hours and 45 minutes of maternal pushing. NICU team was present for delivery as amniotic fluid stained with meconium. The infant was evaluated at the warmer and then placed on mother's abdomen for skin to skin and bulb suctioning.    Infant delivered position OP over intact perineum. Ritgen maneuver performed.   Nuchal cord: No.    Spontaneous delivery of placenta and IV pitocin given noting good uterine tone.  3A laceration noted and repaired in normal fashion with 2-0 Vicryl. Digital rectal exam following repair performed. No signs of rectal involvement, mucosa intact. Cefoxitin 2g given.    Patient tolerated delivery well. Sponge needle and lap counted correctly x2.    EBL: 400mL    Gila Stark MD  OBGYN   PGY-1      Indications:  (spontaneous vaginal delivery)  Pregnancy complicated by:   Patient Active Problem List   Diagnosis    Positive pregnancy test    Supervision of normal first pregnancy, antepartum    Encounter for induction of labor     (spontaneous vaginal delivery)     Admitting GA: 39w3d    Delivery Information for Nba Duong    Birth information:  YOB: 2025   Time of birth: 9:14 AM   Sex: female   Head Delivery Date/Time: 10/21/2024  9:14 AM   Delivery type: Vaginal, Spontaneous   Gestational Age: 39w3d       Delivery Providers    Delivering clinician: Margarita Agarwal MD   Provider Role    Gila Stark MD Naquin, Lydia C, RN     Ava, Mee GRANADOS RN               Measurements    Weight: 3660 g  Weight (lbs): 8 lb 1.1 oz  Length: 53.3 cm  Length (in): 21"  Head circumference: 34.3 cm  Chest circumference: 34.3 cm         Apgars    Living status: Living  Apgar Component Scores:  1 min.:  5 min.:  10 min.:  15 min.:  20 min.:    Skin color:  0  1       Heart rate:  2  2       Reflex irritability:  2  2       Muscle tone:  1  2 "       Respiratory effort:  2  2       Total:  7  9       Apgars assigned by: NICU         Operative Delivery    Forceps attempted?: No  Vacuum extractor attempted?: No         Shoulder Dystocia    Shoulder dystocia present?: No           Presentation    Presentation: Vertex  Position: Middle Occiput Posterior           Interventions/Resuscitation    Method: NICU Attended       Cord    Vessels: 3 vessels  Complications: None  Delayed Cord Clamping?: Yes  Cord Clamped Date/Time: 10/21/2024  9:15 AM  Cord Blood Disposition: Lab  Gases Sent?: No  Stem Cell Collection (by MD): No       Placenta    Placenta delivery date/time: 10/21/2024 0918  Placenta removal: Spontaneous  Placenta appearance: Intact  Placenta disposition: Discarded           Labor Events:       labor:       Labor Onset Date/Time:         Dilation Complete Date/Time:         Start Pushing Date/Time: 10/21/2024 06:30       Start Pushing Date/Time: 10/21/2024 06:30     Rupture Date/Time: 10/20/24 1320        Rupture type: ARM (Artificial Rupture)        Fluid Amount:       Fluid Color: Green              steroids:       Antibiotics given for GBS:       Induction: misoprostol     Indications for induction:  Elective     Augmentation: amniotomy;oxytocin     Indications for augmentation: Ineffective Contraction Pattern     Labor complications: None     Additional complications:          Cervical ripening:                     Delivery:      Episiotomy: None     Indication for Episiotomy:       Perineal Lacerations: 3rd Repaired:  Yes   Periurethral Laceration:   Repaired:     Labial Laceration:   Repaired:     Sulcus Laceration:   Repaired:     Vaginal Laceration:   Repaired:     Cervical Laceration:   Repaired:     Repair suture:       Repair # of packets: 2     Last Value - EBL - Nursing (mL):       Sum - EBL - Nursing (mL): 0     Last Value - EBL - Anesthesia (mL):      Calculated QBL (mL): 583     Running total QBL (mL): 583     Vaginal  Sweep Performed: No     Surgicount Correct: Yes     Vaginal Packing: No Quantity:       Other providers:       Anesthesia    Method: Epidural          Details (if applicable):  Trial of Labor      Categorization:      Priority:     Indications for :     Incision Type:       Additional  information:  Forceps:    Vacuum:    Breech:    Observed anomalies    Other (Comments):       I was present for the entire procedure, and agree with the above resident's assessment of findings and description of procedure.  Margarita Agarwal M.D.

## 2024-10-21 NOTE — PROGRESS NOTES
"LABOR NOTE    S:  Complaints: No.  Epidural working:  working well   Resident to bedside for routine cervical exam    O: /71   Pulse 95   Temp 99.2 °F (37.3 °C) (Axillary)   Resp 16   Ht 5' 3" (1.6 m)   Wt 81.6 kg (180 lb)   SpO2 100%   Breastfeeding No   BMI 31.89 kg/m²     FHT: 125 bpm, moderate variability, + accels, - decel Cat 1 (overall reassuring)  CTX: q 7 minutes; patient continues to make cervical change on her own. Will keep pitocin off at this time  SVE: 9/90/0    TIMELINE:  0115: 3/60/-3, vivar out, cytotec x1 given  0530: 3/60/-3, start pitocin  0930: 3/50/-3, AROM at next  check   1315: 4/70/-2, AROM mec  1715: 4/70/-2, IUPC declined, wants IUPC at next check if unchanged  2000: 5/70/-2; IUPC placed  2220: 5/70/-2; IUPC remains in place; IV fluid bolus initiated; pitocin decreased from 12 > 6  0050: 8/80/0; pitocin paused  0300: 9/90/0    PLAN:  Continue Close Maternal/Fetal Monitoring  Recheck 2 hours or PRN  PCN for GBS    Angie Jenkins MD  Obstetrics and Gynecology - PGY2   "

## 2024-10-21 NOTE — PROGRESS NOTES
"LABOR NOTE    S:  Complaints: No.  Epidural working:  working well   Resident to bedside for 2 minute deceleration, resolved with maternal re-positioning. Fluid bolus initiated upon my entry into the room    O: /69   Pulse 81   Temp 98.6 °F (37 °C) (Oral)   Resp 16   Ht 5' 3" (1.6 m)   Wt 81.6 kg (180 lb)   SpO2 99%   Breastfeeding No   BMI 31.89 kg/m²     FHT: 120 bpm, moderate variability, + accels, + decel (2 minute decel, resolved with maternal repositioning; pitocin halved 2/2 tachysystole) Cat 2 (overall reassuring)  CTX: q 1 minute, pitocin decreased from 12 > 6  SVE: 5/70/-2; IUPC remains in place    TIMELINE:  0115: 3/60/-3, vivar out, cytotec x1 given  0530: 3/60/-3, start pitocin  0930: 3/50/-3, AROM at next  check   1315: 4/70/-2, AROM mec  1715: 4/70/-2, IUPC declined, wants IUPC at next check if unchanged  2000: 5/70/-2; IUPC placed  2220: 5/70/-2; IUPC remains in place; IV fluid bolus initiated; pitocin decreased from 12 > 6    PLAN:  Continue Close Maternal/Fetal Monitoring  Pitocin halved 2/2 tachysystole  Recheck 4 hours or PRN  PCN for GBS    Angie Jenkins MD  Obstetrics and Gynecology - PGY2     "

## 2024-10-21 NOTE — PROGRESS NOTES
POSTPARTUM PROGRESS NOTE    Subjective:     PPD/POD#: 1   Procedure:    EGA: 39w3d   N/V: No   F/C: No   Abd Pain: Mild, well-controlled with oral pain medication   Lochia: Mild   Voiding: Yes   Ambulating: Yes   Bowel fnc: Yes   Contraception: Patch?     Objective:      Temp:  [98 °F (36.7 °C)-100.8 °F (38.2 °C)] 98 °F (36.7 °C)  Pulse:  [] 89  Resp:  [16-18] 18  SpO2:  [71 %-100 %] 100 %  BP: (104-149)/(53-94) 120/64    Abdomen: Soft, appropriately tender   Uterus: Firm, no fundal tenderness   Incision: N/A     Lab Review    Recent Labs   Lab 10/19/24  1632   *   K 3.5      CO2 17*   BUN 7   CREATININE 0.9   GLU 79   PROT 7.2   BILITOT 0.4   ALKPHOS 166*   ALT 14   AST 21       Recent Labs   Lab 10/19/24  1632 10/20/24  0036   WBC 12.33 13.90*   HGB 9.1* 10.0*   HCT 28.3* 30.7*   MCV 86 86    184         I/O    Intake/Output Summary (Last 24 hours) at 10/21/2024 1656  Last data filed at 10/21/2024 1148  Gross per 24 hour   Intake 882.81 ml   Output 1383 ml   Net -500.19 ml        Assessment and Plan:   Postpartum care:  - Patient doing well.  - Continue routine management and advances.    gHTN  - no meds  - Labs: 10/31/184 Cr 0.9 AST/ALT  PCR 0.18  - continue monitoring BPs    RNI  - MMR postpartum    Anemia  - H/H as above  - asymptomatic    3A deg lac  - s/p cefoxitin  - stool softeners scheduled.

## 2024-10-21 NOTE — PROGRESS NOTES
"LABOR NOTE    S:  Complaints: No.  Epidural working:  working well   Resident to bedside for routine cervical exam    O: /70   Pulse 100   Temp 98.8 °F (37.1 °C) (Oral)   Resp 16   Ht 5' 3" (1.6 m)   Wt 81.6 kg (180 lb)   SpO2 98%   Breastfeeding No   BMI 31.89 kg/m²     FHT: 135 bpm, moderate variability, + accels, - decel Cat 1 (overall reassuring)  CTX: q 7 minutes  SVE: 10/100/+2    TIMELINE:  0115: 3/60/-3, vivar out, cytotec x1 given  0530: 3/60/-3, start pitocin  0930: 3/50/-3, AROM at next  check   1315: 4/70/-2, AROM mec  1715: 4/70/-2, IUPC declined, wants IUPC at next check if unchanged  2000: 5/70/-2; IUPC placed  2220: 5/70/-2; IUPC remains in place; IV fluid bolus initiated; pitocin decreased from 12 > 6  0050: 8/80/0; pitocin paused  0300: 9/90/0  0600: 10/100/+2    PLAN:  Continue Close Maternal/Fetal Monitoring  Recheck 2 hours or PRN  PCN for GBS    Angie Jenkins MD  Obstetrics and Gynecology - PGY2  "

## 2024-10-21 NOTE — LACTATION NOTE
Breastfeeding basics reviewed via breastfeeding guide. Latch assistance given. Baby placed in football hold, easily latched with minimal assistance needed. Baby taking good sucks with breast compression. Pt encouraged to feed the baby 8 or more times in 24hrs on cue until content.    10/21/24 1350   Maternal Assessment   Breast Shape Bilateral:;round   Breast Density Bilateral:;soft   Areola Bilateral:;elastic   Nipples Bilateral:;everted   Maternal Infant Feeding   Maternal Emotional State assist needed   Infant Positioning clutch/football   Signs of Milk Transfer audible swallow;infant jaw motion present   Nipple Shape After Feeding, Left round   Latch Assistance yes

## 2024-10-21 NOTE — PROGRESS NOTES
"LABOR NOTE    S:  Complaints: No.  Epidural working:  working well   Resident to bedside per patient request 2/2 increased pelvic pressure    O: /79   Pulse 95   Temp 98.6 °F (37 °C) (Oral)   Resp 16   Ht 5' 3" (1.6 m)   Wt 81.6 kg (180 lb)   SpO2 100%   Breastfeeding No   BMI 31.89 kg/m²     FHT: 120 bpm, moderate variability, + accels, + decel (4 minute decel while in the room checking the patient, resolved with maternal repositioning; pitocin paused for 30 minutes 2/2 tachysystole) Cat 2 (overall reassuring)  CTX: q 1-2 minute, pitocin decreased from 5 > paused  SVE: 8/80/0    TIMELINE:  0115: 3/60/-3, vivar out, cytotec x1 given  0530: 3/60/-3, start pitocin  0930: 3/50/-3, AROM at next  check   1315: 4/70/-2, AROM mec  1715: 4/70/-2, IUPC declined, wants IUPC at next check if unchanged  2000: 5/70/-2; IUPC placed  2220: 5/70/-2; IUPC remains in place; IV fluid bolus initiated; pitocin decreased from 12 > 6  0050: 8/80/0; pitocin paused    PLAN:  Continue Close Maternal/Fetal Monitoring  4 minute decel while in the room checking the patient, resolved with maternal repositioning; pitocin paused for 30 mintues 2/2 tachysystole  Recheck 2 hours or PRN  PCN for GBS    Angie Jenkins MD  Obstetrics and Gynecology - PGY2   "

## 2024-10-21 NOTE — PROGRESS NOTES
"LABOR NOTE    S:  Complaints: No.  Epidural working:  working well   Resident to bedside for 4 minute deceleration, resolved with maternal re-positioning. Pitocin paused upon arrival into room.     O: /70   Pulse 75   Temp 99.5 °F (37.5 °C) (Axillary)   Resp 17   Ht 5' 3" (1.6 m)   Wt 81.6 kg (180 lb)   SpO2 100%   Breastfeeding No   BMI 31.89 kg/m²     FHT: 130 bpm, moderate variability, + accels, + decel (4 minute decel, resolved with maternal repositioning; pitocin paused) Cat 2 (overall reassuring)  CTX: q 1-2 minutes, pitocin paused; will restart in 30 minutes if FHTs reassuring  SVE: 5/70/-2; IUPC placed    TIMELINE:  0115: 3/60/-3, vivar out, cytotec x1 given  0530: 3/60/-3, start pitocin  0930: 3/50/-3, AROM at next  check   1315: 4/70/-2, AROM mec  1715: 4/70/-2, IUPC declined, wants IUPC at next check if unchanged  2000: 5/70/-2; IUPC placed    PLAN:  Continue Close Maternal/Fetal Monitoring  Pitocin paused for 30 minutes. If FHTs improved, will re-start pitocin at half  Recheck 4 hours or PRN  PCN for GBS    Angie Jenkins MD  Obstetrics and Gynecology - PGY2     "

## 2024-10-22 PROBLEM — Z28.39 RUBELLA NON-IMMUNE STATUS, DELIVERED, CURRENT HOSPITALIZATION: Status: ACTIVE | Noted: 2024-10-22

## 2024-10-22 PROBLEM — Z34.90 ENCOUNTER FOR INDUCTION OF LABOR: Status: RESOLVED | Noted: 2024-10-20 | Resolved: 2024-10-22

## 2024-10-22 PROBLEM — D62 ACUTE BLOOD LOSS ANEMIA: Status: ACTIVE | Noted: 2024-10-22

## 2024-10-22 PROBLEM — O13.9 GESTATIONAL HYPERTENSION: Status: ACTIVE | Noted: 2024-10-22

## 2024-10-22 LAB
BASOPHILS # BLD AUTO: 0.04 K/UL (ref 0–0.2)
BASOPHILS NFR BLD: 0.2 % (ref 0–1.9)
DIFFERENTIAL METHOD BLD: ABNORMAL
EOSINOPHIL # BLD AUTO: 0.1 K/UL (ref 0–0.5)
EOSINOPHIL NFR BLD: 0.4 % (ref 0–8)
ERYTHROCYTE [DISTWIDTH] IN BLOOD BY AUTOMATED COUNT: 14.8 % (ref 11.5–14.5)
HCT VFR BLD AUTO: 24.2 % (ref 37–48.5)
HGB BLD-MCNC: 7.7 G/DL (ref 12–16)
IMM GRANULOCYTES # BLD AUTO: 0.09 K/UL (ref 0–0.04)
IMM GRANULOCYTES NFR BLD AUTO: 0.5 % (ref 0–0.5)
LYMPHOCYTES # BLD AUTO: 2.3 K/UL (ref 1–4.8)
LYMPHOCYTES NFR BLD: 13.8 % (ref 18–48)
MCH RBC QN AUTO: 27.6 PG (ref 27–31)
MCHC RBC AUTO-ENTMCNC: 31.8 G/DL (ref 32–36)
MCV RBC AUTO: 87 FL (ref 82–98)
MONOCYTES # BLD AUTO: 1.1 K/UL (ref 0.3–1)
MONOCYTES NFR BLD: 6.5 % (ref 4–15)
NEUTROPHILS # BLD AUTO: 13.3 K/UL (ref 1.8–7.7)
NEUTROPHILS NFR BLD: 78.6 % (ref 38–73)
NRBC BLD-RTO: 0 /100 WBC
PLATELET # BLD AUTO: 146 K/UL (ref 150–450)
PMV BLD AUTO: 12.2 FL (ref 9.2–12.9)
RBC # BLD AUTO: 2.79 M/UL (ref 4–5.4)
WBC # BLD AUTO: 16.94 K/UL (ref 3.9–12.7)

## 2024-10-22 PROCEDURE — 11000001 HC ACUTE MED/SURG PRIVATE ROOM

## 2024-10-22 PROCEDURE — 36415 COLL VENOUS BLD VENIPUNCTURE: CPT | Performed by: STUDENT IN AN ORGANIZED HEALTH CARE EDUCATION/TRAINING PROGRAM

## 2024-10-22 PROCEDURE — 25000003 PHARM REV CODE 250

## 2024-10-22 PROCEDURE — 72100002 HC LABOR CARE, 1ST 8 HOURS

## 2024-10-22 PROCEDURE — 85025 COMPLETE CBC W/AUTO DIFF WBC: CPT | Performed by: STUDENT IN AN ORGANIZED HEALTH CARE EDUCATION/TRAINING PROGRAM

## 2024-10-22 PROCEDURE — 99232 SBSQ HOSP IP/OBS MODERATE 35: CPT | Mod: ,,, | Performed by: OBSTETRICS & GYNECOLOGY

## 2024-10-22 PROCEDURE — 72200005 HC VAGINAL DELIVERY LEVEL II

## 2024-10-22 PROCEDURE — 72100003 HC LABOR CARE, EA. ADDL. 8 HRS

## 2024-10-22 RX ORDER — POLYETHYLENE GLYCOL 3350 17 G/17G
17 POWDER, FOR SOLUTION ORAL DAILY
Qty: 510 G | Refills: 0 | Status: SHIPPED | OUTPATIENT
Start: 2024-10-22 | End: 2024-11-22

## 2024-10-22 RX ORDER — LANOLIN ALCOHOL/MO/W.PET/CERES
1 CREAM (GRAM) TOPICAL DAILY
Status: DISCONTINUED | OUTPATIENT
Start: 2024-10-22 | End: 2024-10-23 | Stop reason: HOSPADM

## 2024-10-22 RX ORDER — FERROUS SULFATE 325(65) MG
325 TABLET, DELAYED RELEASE (ENTERIC COATED) ORAL DAILY
Qty: 30 TABLET | Refills: 2 | Status: SHIPPED | OUTPATIENT
Start: 2024-10-22

## 2024-10-22 RX ADMIN — IBUPROFEN 600 MG: 600 TABLET, FILM COATED ORAL at 04:10

## 2024-10-22 RX ADMIN — POLYETHYLENE GLYCOL 3350 17 G: 17 POWDER, FOR SOLUTION ORAL at 08:10

## 2024-10-22 RX ADMIN — DOCUSATE SODIUM 200 MG: 100 CAPSULE, LIQUID FILLED ORAL at 08:10

## 2024-10-22 RX ADMIN — ACETAMINOPHEN 650 MG: 325 TABLET, FILM COATED ORAL at 10:10

## 2024-10-22 RX ADMIN — ACETAMINOPHEN 650 MG: 325 TABLET, FILM COATED ORAL at 04:10

## 2024-10-22 RX ADMIN — DOCUSATE SODIUM 200 MG: 100 CAPSULE, LIQUID FILLED ORAL at 10:10

## 2024-10-22 RX ADMIN — IBUPROFEN 600 MG: 600 TABLET, FILM COATED ORAL at 10:10

## 2024-10-22 RX ADMIN — IBUPROFEN 600 MG: 600 TABLET, FILM COATED ORAL at 09:10

## 2024-10-22 RX ADMIN — PRENATAL VIT W/ FE FUMARATE-FA TAB 27-0.8 MG 1 TABLET: 27-0.8 TAB at 08:10

## 2024-10-22 RX ADMIN — OXYCODONE HYDROCHLORIDE 10 MG: 10 TABLET ORAL at 08:10

## 2024-10-22 RX ADMIN — ACETAMINOPHEN 650 MG: 325 TABLET, FILM COATED ORAL at 09:10

## 2024-10-22 RX ADMIN — OXYCODONE HYDROCHLORIDE 10 MG: 10 TABLET ORAL at 09:10

## 2024-10-22 RX ADMIN — OXYCODONE HYDROCHLORIDE 10 MG: 10 TABLET ORAL at 05:10

## 2024-10-22 RX ADMIN — OXYCODONE HYDROCHLORIDE 10 MG: 10 TABLET ORAL at 01:10

## 2024-10-22 RX ADMIN — OXYCODONE HYDROCHLORIDE 10 MG: 10 TABLET ORAL at 04:10

## 2024-10-22 NOTE — LACTATION NOTE
10/22/24 1055   Maternal Assessment   Breast Shape Bilateral:;round   Breast Density Bilateral:;soft   Areola Bilateral:;elastic   Nipples Bilateral:;everted   Maternal Infant Feeding   Maternal Preparation breast care;hand hygiene   Maternal Emotional State assist needed;relaxed   Infant Positioning clutch/football   Signs of Milk Transfer audible swallow;infant jaw motion present   Pain with Feeding no   Comfort Measures Following Feeding air-drying encouraged;expressed milk applied   Nipple Shape After Feeding, Right round   Latch Assistance yes   Community Referrals   Community Referrals outpatient lactation program     LC to room: introduced self, feedings reviewed. Infant showing hunger cues, reviewed cues with parents and encouraged to feed. Client accepted assistance with latch, R FB, fed well, feeling good tugs and pulls.   Education provided utilizing the MB/NB/Breastfeeding booklet. Feeding on cue 8 or more in 24 hours reviewed. Feeding cues and satiation cues reviewed. Intake amount and diaper counts expected on current day of life up to day 5 reviewed. Cluster feeding reviewed.  Extension on whiteboard, all questions answered, client and FOB verbalized understanding to call PRN. MBU RN updated.

## 2024-10-22 NOTE — ANESTHESIA POSTPROCEDURE EVALUATION
Anesthesia Post Evaluation    Patient: Rojelio Duong    Procedure(s) Performed: * No procedures listed *    Final Anesthesia Type: epidural      Patient location during evaluation: labor & delivery  Patient participation: Yes- Able to Participate  Level of consciousness: awake and alert, awake and oriented  Post-procedure vital signs: reviewed and stable  Pain management: adequate  Airway patency: patent  SUJIT mitigation strategies: Multimodal analgesia and Use of major conduction anesthesia (spinal/epidural) or peripheral nerve block  PONV status at discharge: No PONV  Anesthetic complications: no      Cardiovascular status: blood pressure returned to baseline and hemodynamically stable  Respiratory status: unassisted, spontaneous ventilation and room air  Hydration status: euvolemic  Follow-up not needed.              Vitals Value Taken Time   /91 10/22/24 0700   Temp 36.7 °C (98 °F) 10/22/24 0700   Pulse 97 10/22/24 0700   Resp 18 10/22/24 0957   SpO2 100 % 10/22/24 0700         No case tracking events are documented in the log.      Pain/Jeremiah Score: Pain Rating Prior to Med Admin: 9 (10/22/2024  9:57 AM)  Pain Rating Post Med Admin: 6 (10/22/2024 10:30 AM)

## 2024-10-22 NOTE — NURSING
The following message was sent to dr rodriguez's staff:  Hi, can you please call ms nicolas to set up a Post partum BP & Incision check (3rd degree) on or before Wednesday 10/30. Thank you.

## 2024-10-22 NOTE — PROGRESS NOTES
POSTPARTUM PROGRESS NOTE    Subjective:     PPD/POD#: 1   Procedure:    EGA: 39w3d   N/V: No   F/C: No   Abd Pain: Mild, well-controlled with oral pain medication   Lochia: Mild   Voiding: Yes   Ambulating: Yes   Bowel fnc: Yes   Contraception: Interested in patch     Objective:      Temp:  [97.7 °F (36.5 °C)-99.5 °F (37.5 °C)] 98 °F (36.7 °C)  Pulse:  [80-97] 97  Resp:  [17-18] 18  SpO2:  [99 %-100 %] 100 %  BP: (120-139)/(64-91) 137/91    Abdomen: Soft, appropriately tender   Uterus: Firm, no fundal tenderness   Incision: N/A     Lab Review    Recent Labs   Lab 10/19/24  1632   *   K 3.5      CO2 17*   BUN 7   CREATININE 0.9   GLU 79   PROT 7.2   BILITOT 0.4   ALKPHOS 166*   ALT 14   AST 21       Recent Labs   Lab 10/19/24  1632 10/20/24  0036 10/22/24  0423   WBC 12.33 13.90* 16.94*   HGB 9.1* 10.0* 7.7*   HCT 28.3* 30.7* 24.2*   MCV 86 86 87    184 146*         I/O    Intake/Output Summary (Last 24 hours) at 10/22/2024 1050  Last data filed at 10/21/2024 1930  Gross per 24 hour   Intake 1.03 ml   Output 2200 ml   Net -2198.97 ml        Assessment and Plan:   Postpartum care:  - Patient doing well.  - Continue routine management and advances.     gHTN  - no indication for medications  - Labs: 10/31/184 Cr 0.9 AST/ALT  PCR 0.18  - continue monitoring BPs     RNI  - MMR postpartum     Anemia  - H/H as above  - asymptomatic     3A deg lac  - s/p cefoxitin  - stool softeners scheduled.     Floresita Matos MD  OB/GYN

## 2024-10-22 NOTE — PLAN OF CARE
Pt free from falls, injury or any further trauma during this time. VSS. Fundus midline and firm, moderate lochia rubra. Continued medications as ordered. Pain adequately controlled with medications. Assistance with feeding provided as needed. Pt in no distress. Plan of care ongoing  Problem:  Fall Injury Risk  Goal: Absence of Fall, Infant Drop and Related Injury  Outcome: Progressing     Problem: Adult Inpatient Plan of Care  Goal: Plan of Care Review  Outcome: Progressing  Goal: Patient-Specific Goal (Individualized)  Outcome: Progressing  Goal: Absence of Hospital-Acquired Illness or Injury  Outcome: Progressing  Goal: Optimal Comfort and Wellbeing  Outcome: Progressing  Goal: Readiness for Transition of Care  Outcome: Progressing     Problem: Infection  Goal: Absence of Infection Signs and Symptoms  Outcome: Progressing     Problem: Labor  Goal: Hemostasis  Outcome: Progressing  Goal: Stable Fetal Wellbeing  Outcome: Progressing  Goal: Effective Progression to Delivery  Outcome: Progressing  Goal: Absence of Infection Signs and Symptoms  Outcome: Progressing  Goal: Acceptable Pain Control  Outcome: Progressing  Goal: Normal Uterine Contraction Pattern  Outcome: Progressing     Problem: Skin Injury Risk Increased  Goal: Skin Health and Integrity  Outcome: Progressing     Problem: Anesthesia/Analgesia, Neuraxial  Goal: Safe, Effective Infusion Delivery  Outcome: Progressing  Goal: Stable Patient-Fetal Status  Outcome: Progressing  Goal: Absence of Infection Signs and Symptoms  Outcome: Progressing  Goal: Nausea and Vomiting Relief  Outcome: Progressing  Goal: Effective Pain Control  Outcome: Progressing  Goal: Effective Oxygenation and Ventilation  Outcome: Progressing  Goal: Baseline Motor Function Return  Outcome: Progressing  Goal: Effective Urinary Elimination  Outcome: Progressing     Problem: Breastfeeding  Goal: Effective Breastfeeding  Outcome: Progressing

## 2024-10-23 VITALS
HEART RATE: 90 BPM | WEIGHT: 180 LBS | RESPIRATION RATE: 16 BRPM | BODY MASS INDEX: 31.89 KG/M2 | OXYGEN SATURATION: 99 % | HEIGHT: 63 IN | TEMPERATURE: 99 F | DIASTOLIC BLOOD PRESSURE: 76 MMHG | SYSTOLIC BLOOD PRESSURE: 129 MMHG

## 2024-10-23 PROCEDURE — 25000003 PHARM REV CODE 250

## 2024-10-23 PROCEDURE — 99238 HOSP IP/OBS DSCHRG MGMT 30/<: CPT | Mod: ,,, | Performed by: OBSTETRICS & GYNECOLOGY

## 2024-10-23 RX ORDER — OXYCODONE HYDROCHLORIDE 5 MG/1
5 TABLET ORAL EVERY 4 HOURS PRN
Qty: 6 TABLET | Refills: 0 | Status: SHIPPED | OUTPATIENT
Start: 2024-10-23

## 2024-10-23 RX ADMIN — OXYCODONE HYDROCHLORIDE 10 MG: 10 TABLET ORAL at 05:10

## 2024-10-23 RX ADMIN — OXYCODONE HYDROCHLORIDE 10 MG: 10 TABLET ORAL at 12:10

## 2024-10-23 RX ADMIN — OXYCODONE HYDROCHLORIDE 10 MG: 10 TABLET ORAL at 10:10

## 2024-10-23 RX ADMIN — IBUPROFEN 600 MG: 600 TABLET, FILM COATED ORAL at 10:10

## 2024-10-23 RX ADMIN — IBUPROFEN 600 MG: 600 TABLET, FILM COATED ORAL at 05:10

## 2024-10-23 RX ADMIN — DOCUSATE SODIUM 200 MG: 100 CAPSULE, LIQUID FILLED ORAL at 08:10

## 2024-10-23 RX ADMIN — PRENATAL VIT W/ FE FUMARATE-FA TAB 27-0.8 MG 1 TABLET: 27-0.8 TAB at 08:10

## 2024-10-23 RX ADMIN — ACETAMINOPHEN 650 MG: 325 TABLET, FILM COATED ORAL at 05:10

## 2024-10-23 RX ADMIN — ACETAMINOPHEN 650 MG: 325 TABLET, FILM COATED ORAL at 10:10

## 2024-10-23 RX ADMIN — POLYETHYLENE GLYCOL 3350 17 G: 17 POWDER, FOR SOLUTION ORAL at 08:10

## 2024-10-23 NOTE — DISCHARGE SUMMARY
Delivery Discharge Summary  Obstetrics      Primary OB Clinician: Margarita Agarwal MD      Admission date: 10/20/2024  Discharge date: 10/23/2024    Disposition: To home, self care    Discharge Diagnosis List:      Patient Active Problem List   Diagnosis    Positive pregnancy test    Supervision of normal first pregnancy, antepartum     (spontaneous vaginal delivery)    Gestational hypertension    Rubella non-immune status, delivered, current hospitalization    Acute blood loss anemia    High vaginal laceration, postpartum       Procedure:     Hospital Course:  Rojelio Duong is a 19 y.o. now , PPD #3 who was admitted on 10/20/2024 at 39w3d for IOL. Patient was subsequently admitted to labor and delivery unit with signed consents.     Labor course was uncomplicated and resulted in  with a 3A perineal tear at time of delivery. Patient was discharged home with 6 oxycodone 5mg for pain relief at perineum.     Please see delivery note for further details. Her postpartum course was uncomplicated. She had a history of gHTN however her pressures are well controlled and there is no indication for anti-hypertensive medication. On discharge day, patient's pain is controlled with oral pain medications. Pt is tolerating ambulation without SOB or CP, and regular diet without N/V. Reports lochia is mild. Denies any HA, vision changes, F/C, LE swelling. Denies any breast pain/soreness.    Pt in stable condition and ready for discharge. She has been instructed to start and/or continue medications and follow up with her obstetrics provider as listed below.    Vital Signs:   Temp:  [97.8 °F (36.6 °C)-98.7 °F (37.1 °C)] 98.7 °F (37.1 °C)  Pulse:  [] 90  Resp:  [16-18] 16  SpO2:  [99 %-100 %] 99 %  BP: (118-140)/(65-91) 129/76    Physical Exam:  Abdomen: Soft, appropriately tender   Uterus: Firm, no fundal tenderness   Incision: N/A       Pertinent studies:  CBC  Recent Labs   Lab 10/19/24  1632 10/20/24  0036  "10/22/24  0423   WBC 12.33 13.90* 16.94*   HGB 9.1* 10.0* 7.7*   HCT 28.3* 30.7* 24.2*   MCV 86 86 87    184 146*        Immunization History   Administered Date(s) Administered    COVID-19, MRNA, LN-S, PF (Pfizer) (Purple Cap) 2021, 2021    DTaP 2004, 2004, 2004, 2005, 06/10/2008    HPV 9-Valent 2015, 2017    Hep B / HiB 2004, 2004    Hepatitis A, Pediatric/Adolescent, 2 Dose 2017    Hepatitis B, Pediatric/Adolescent 2004    HiB PRP-OMP 2005    Hib-HbOC 2004    IPV 2004, 2004, 2005, 06/10/2008    MMR 2005, 06/10/2008    Meningococcal Conjugate (MCV4O) 2 Vial (2mo-55yr) 2015    Meningococcal Conjugate (MCV4P) 2015    Pneumococcal Conjugate - 7 Valent 2004, 2004, 2004, 2004    Tdap 2015, 2015, 2024    Varicella 2005, 06/10/2008        Delivery:    Episiotomy: None   Lacerations: 3rd   Repair suture:     Repair # of packets: 2   Blood loss (ml):       Birth information:  YOB: 2025   Time of birth: 9:14 AM   Sex: female   Delivery type: Vaginal, Spontaneous   Gestational Age: 39w3d     Measurements    Weight: 3660 g  Weight (lbs): 8 lb 1.1 oz  Length: 53.3 cm  Length (in): 21"  Head circumference: 34.3 cm  Chest circumference: 34.3 cm         Delivery Clinician: Delivery Providers    Delivering clinician: Margarita Agarwal MD   Provider Role    Gila Stark MD Naquin, Lydia C, RN     Ava, Mee GRANADOS RN              Additional  information:  Forceps:    Vacuum:    Breech:    Observed anomalies      Living?:     Apgars    Living status: Living  Apgar Component Scores:  1 min.:  5 min.:  10 min.:  15 min.:  20 min.:    Skin color:  0  1       Heart rate:  2  2       Reflex irritability:  2  2       Muscle tone:  1  2       Respiratory effort:  2  2       Total:  7  9       Apgars assigned by: NICU         Placenta: Delivered: "       appearance    Patient Instructions:   Current Discharge Medication List        START taking these medications    Details   docusate sodium (COLACE) 100 MG capsule Take 2 capsules (200 mg total) by mouth 2 (two) times daily.  Qty: 120 capsule, Refills: 0      ferrous sulfate 325 (65 FE) MG EC tablet Take 1 tablet (325 mg total) by mouth once daily.  Qty: 30 tablet, Refills: 2      ibuprofen (ADVIL,MOTRIN) 600 MG tablet Take 1 tablet (600 mg total) by mouth every 6 (six) hours. Alternate between ibuprofen and tylenol every 3 hours. For example: @0800: ibuprofen 600mg @1100: tylenol 650mg @1400: ibuprofen 600mg @1700: tylenol 650 mg @2000: ibuprofen 600mg  Qty: 30 tablet, Refills: 1      oxyCODONE (ROXICODONE) 5 MG immediate release tablet Take 1 tablet (5 mg total) by mouth every 4 (four) hours as needed for Pain.  Qty: 6 tablet, Refills: 0    Comments: Quantity prescribed more than 7 day supply? No      polyethylene glycol (GLYCOLAX) 17 gram/dose powder Take 17 g by mouth once daily.  Qty: 510 g, Refills: 0           CONTINUE these medications which have CHANGED    Details   acetaminophen (TYLENOL) 325 MG tablet Take 2 tablets (650 mg total) by mouth every 6 (six) hours. Alternate between ibuprofen and tylenol every 3 hours. For example: @0800: ibuprofen 600mg @1100: tylenol 650mg @1400: ibuprofen 600mg @1700: tylenol 650 mg @2000: ibuprofen 600mg  Qty: 30 tablet, Refills: 1           CONTINUE these medications which have NOT CHANGED    Details   fluticasone propionate (FLONASE) 50 mcg/actuation nasal spray 1 spray (50 mcg total) by Each Nostril route once daily.  Qty: 9.9 mL, Refills: 0    Associated Diagnoses: Sinusitis, unspecified chronicity, unspecified location      hydrocortisone (WESTCORT) 0.2 % cream Apply to eczema bid  Qty: 60 g, Refills: 5    Associated Diagnoses: Eczema           STOP taking these medications       aspirin (ECOTRIN) 81 MG EC tablet Comments:   Reason for Stopping:          cetirizine (ZYRTEC) 10 MG tablet Comments:   Reason for Stopping:         clobetasol 0.05% (TEMOVATE) 0.05 % Oint Comments:   Reason for Stopping:         dextroamphetamine-amphetamine (ADDERALL XR) 10 MG 24 hr capsule Comments:   Reason for Stopping:         famotidine (PEPCID) 20 MG tablet Comments:   Reason for Stopping:         loratadine (CLARITIN) 5 mg/5 mL syrup Comments:   Reason for Stopping:         mometasone 0.1% (ELOCON) 0.1 % cream Comments:   Reason for Stopping:         phenoL (CHLORASEPTIC THROAT SPRAY) 1.4 % SprA Comments:   Reason for Stopping:         prenatal no115/iron/folic acid (PRENATAL 19 ORAL) Comments:   Reason for Stopping:         triamcinolone acetonide 0.1% (KENALOG) 0.1 % cream Comments:   Reason for Stopping:               Discharge Procedure Orders   BREAST PUMP FOR HOME USE     Order Specific Question Answer Comments   Type of pump: Electric    Weight: 81.6 kg (180 lb)    Length of need (1-99 months): 99      Diet Adult Regular     No driving until:   Order Comments: - Not taking narcotic pain medications  - You feel that you can safely slam on the brakes     Pelvic Rest   Order Comments: Nothing in vagina until evaluation at postpartum visit (no sex, tampons, or douching)     Notify your health care provider if you experience any of the following:  temperature >100.4     Notify your health care provider if you experience any of the following:  persistent nausea and vomiting or diarrhea     Notify your health care provider if you experience any of the following:  severe uncontrolled pain     Notify your health care provider if you experience any of the following:  redness, tenderness, or signs of infection (pain, swelling, redness, odor or green/yellow discharge around incision site)     Notify your health care provider if you experience any of the following:  difficulty breathing or increased cough     Notify your health care provider if you experience any of the following:   severe persistent headache     Notify your health care provider if you experience any of the following:  worsening rash     Notify your health care provider if you experience any of the following:  persistent dizziness, light-headedness, or visual disturbances     Notify your health care provider if you experience any of the following:  increased confusion or weakness     Notify your health care provider if you experience any of the following:   Order Comments: - Heavy vaginal bleeding soaking through more than 2 pads/hour for > 2 hours  - Severe abdominal pain  - Headache not relieved with Tylenol  - Vision changes  - Chest pain or shortness or breath     Activity as tolerated        Follow-up Information       Margarita Agarwal MD Follow up in 1 week(s).    Specialty: Obstetrics and Gynecology  Why: 1 week blood pressure check  Contact information:  [address] [id number] [phone]               Margarita Agarwal MD Follow up in 6 week(s).    Specialty: Obstetrics and Gynecology  Why: 6 week post partum visit  Contact information:  [address] [id number] [phone]               Margarita Agarwal MD Follow up in 1 week(s).    Specialty: Obstetrics and Gynecology  Why: 3A lac check  Contact information:  [address] [address] [phone]                              Gila Stark MD  OBGYN   PGY-1

## 2024-10-23 NOTE — PLAN OF CARE
Discharge instructions given to patient. S&S of PP Pre E & PP hemorrhage gone over with patient. Patient verbalized understanding. Pt is to follow up with OB in 1 week to check healing of laceration and in 6 weeks for postpartum visit. Medications sent to pt room by [facility]. Patient waiting on extra meds from [facility].  Patient waiting on transport.   Problem:  Fall Injury Risk  Goal: Absence of Fall, Infant Drop and Related Injury  10/23/2024 1346 by Olivia Rene RN  Outcome: Met  10/23/2024 1346 by Olivia Rene RN  Outcome: Progressing     Problem: Adult Inpatient Plan of Care  Goal: Plan of Care Review  10/23/2024 1346 by Olivia Rene RN  Outcome: Met  10/23/2024 1346 by Olivia Rene RN  Outcome: Progressing  Goal: Patient-Specific Goal (Individualized)  10/23/2024 1346 by Olivia Rene RN  Outcome: Met  10/23/2024 1346 by Olivia Rene RN  Outcome: Progressing  Goal: Absence of Hospital-Acquired Illness or Injury  10/23/2024 1346 by Olivia Rene RN  Outcome: Met  10/23/2024 1346 by Olivia Rene RN  Outcome: Progressing  Goal: Optimal Comfort and Wellbeing  10/23/2024 1346 by Olivia Rene RN  Outcome: Met  10/23/2024 1346 by Olivia Rene RN  Outcome: Progressing  Goal: Readiness for Transition of Care  10/23/2024 1346 by Olivia Rene RN  Outcome: Met  10/23/2024 1346 by Olivia Rene RN  Outcome: Progressing     Problem: Infection  Goal: Absence of Infection Signs and Symptoms  10/23/2024 1346 by Olivia Rene RN  Outcome: Met  10/23/2024 1346 by Olivia Rene RN  Outcome: Progressing     Problem: Labor  Goal: Hemostasis  10/23/2024 1346 by Olivia Rene RN  Outcome: Met  10/23/2024 1346 by Olivia Rene RN  Outcome: Progressing  Goal: Stable Fetal Wellbeing  10/23/2024 1346 by Olivia Rene RN  Outcome: Met  10/23/2024 1346 by Olivia Rene RN  Outcome: Progressing  Goal: Effective Progression to Delivery  10/23/2024 1346 by  Rene, Dachel, RN  Outcome: Met  10/23/2024 1346 by Olivia Rene RN  Outcome: Progressing  Goal: Absence of Infection Signs and Symptoms  10/23/2024 1346 by Olivia Rene RN  Outcome: Met  10/23/2024 1346 by Olivia Rene RN  Outcome: Progressing  Goal: Acceptable Pain Control  10/23/2024 1346 by Olivia Rene RN  Outcome: Met  10/23/2024 1346 by Olivia Rene RN  Outcome: Progressing  Goal: Normal Uterine Contraction Pattern  10/23/2024 1346 by Olivia Rene RN  Outcome: Met  10/23/2024 1346 by Olivia Rene RN  Outcome: Progressing     Problem: Skin Injury Risk Increased  Goal: Skin Health and Integrity  10/23/2024 1346 by Olivia Rene RN  Outcome: Met  10/23/2024 1346 by Olivia Rene RN  Outcome: Progressing     Problem: Anesthesia/Analgesia, Neuraxial  Goal: Safe, Effective Infusion Delivery  10/23/2024 1346 by Olivia Rene RN  Outcome: Met  10/23/2024 1346 by Olivia Rene RN  Outcome: Progressing  Goal: Stable Patient-Fetal Status  10/23/2024 1346 by Olivia Rene RN  Outcome: Met  10/23/2024 1346 by Olivia Rene RN  Outcome: Progressing  Goal: Absence of Infection Signs and Symptoms  10/23/2024 1346 by Olivia Rene RN  Outcome: Met  10/23/2024 1346 by Olivia Rene RN  Outcome: Progressing  Goal: Nausea and Vomiting Relief  10/23/2024 1346 by Olivia Rene RN  Outcome: Met  10/23/2024 1346 by Olivia Rene RN  Outcome: Progressing  Goal: Effective Pain Control  10/23/2024 1346 by Olivia Rene RN  Outcome: Met  10/23/2024 1346 by Olivia Rene RN  Outcome: Progressing  Goal: Effective Oxygenation and Ventilation  10/23/2024 1346 by Olivia Rene RN  Outcome: Met  10/23/2024 1346 by Olivia Rene RN  Outcome: Progressing  Goal: Baseline Motor Function Return  10/23/2024 1346 by Olivia Rene RN  Outcome: Met  10/23/2024 1346 by Olivia Rene RN  Outcome: Progressing  Goal: Effective Urinary Elimination  10/23/2024 1346 by Edgard  Olivia, RN  Outcome: Met  10/23/2024 1346 by Olivia Rene, RN  Outcome: Progressing

## 2024-10-23 NOTE — PROGRESS NOTES
POSTPARTUM PROGRESS NOTE    Subjective:     PPD/POD#: 2   Procedure:    EGA: 39w3d   N/V: No   F/C: No   Abd Pain: Mild, well-controlled with oral pain medication   Lochia: Mild   Voiding: Yes   Ambulating: Yes   Bowel fnc: Yes   Contraception: Interested in patch     Objective:      Temp:  [97.8 °F (36.6 °C)-98.2 °F (36.8 °C)] 97.8 °F (36.6 °C)  Pulse:  [] 88  Resp:  [16-18] 17  SpO2:  [99 %-100 %] 99 %  BP: (118-140)/(72-91) 118/72    Abdomen: Soft, appropriately tender   Uterus: Firm, no fundal tenderness   Incision: N/A     Lab Review    Recent Labs   Lab 10/19/24  1632   *   K 3.5      CO2 17*   BUN 7   CREATININE 0.9   GLU 79   PROT 7.2   BILITOT 0.4   ALKPHOS 166*   ALT 14   AST 21       Recent Labs   Lab 10/19/24  1632 10/20/24  0036 10/22/24  0423   WBC 12.33 13.90* 16.94*   HGB 9.1* 10.0* 7.7*   HCT 28.3* 30.7* 24.2*   MCV 86 86 87    184 146*         I/O  No intake or output data in the 24 hours ending 10/23/24 0627       Assessment and Plan:   Postpartum care:  - Patient doing well.  - Continue routine management and advances.     gHTN  - no indication for medications  - Labs: 10/31/184 Cr 0.9 AST/ALT  PCR 0.18  - continue monitoring BPs     RNI  - MMR postpartum     Anemia  - H/H as above  - asymptomatic  - iron/colace     3A deg lac  - s/p cefoxitin  - stool softeners scheduled.   - perineum to be evaluated by MD before discharge.    Marc Plascencia MD  Obstetrics and Gynecology- PGY2

## 2024-10-23 NOTE — LACTATION NOTE
Lactation Note: Breastfeeding Discharge Instructions given to mother. Reviewed Mother's Breastfeeding Guide and First Alert form. Encouraged mother to continue to nurse baby on cue until content 8 or more feedings/24 hours, observe for signs of milk transfer, and keep I&O log. Reviewed risks of formula feeding while nursing and encouraged exclusive breastfeeding. Questions answered. LC number written on board. Encouraged to call prior to discharge for latch assessment/assistance.

## 2024-10-24 ENCOUNTER — PATIENT MESSAGE (OUTPATIENT)
Dept: OBSTETRICS AND GYNECOLOGY | Facility: OTHER | Age: 19
End: 2024-10-24
Payer: MEDICAID

## 2024-11-04 ENCOUNTER — PATIENT MESSAGE (OUTPATIENT)
Facility: CLINIC | Age: 19
End: 2024-11-04
Payer: MEDICAID

## 2024-12-03 ENCOUNTER — TELEPHONE (OUTPATIENT)
Dept: OBSTETRICS AND GYNECOLOGY | Facility: CLINIC | Age: 19
End: 2024-12-03
Payer: MEDICAID

## 2024-12-19 ENCOUNTER — POSTPARTUM VISIT (OUTPATIENT)
Dept: OBSTETRICS AND GYNECOLOGY | Facility: CLINIC | Age: 19
End: 2024-12-19
Payer: MEDICAID

## 2024-12-19 VITALS — DIASTOLIC BLOOD PRESSURE: 69 MMHG | SYSTOLIC BLOOD PRESSURE: 119 MMHG

## 2024-12-19 PROCEDURE — 99999 PR PBB SHADOW E&M-EST. PATIENT-LVL III: CPT | Mod: PBBFAC,,, | Performed by: STUDENT IN AN ORGANIZED HEALTH CARE EDUCATION/TRAINING PROGRAM

## 2024-12-19 PROCEDURE — 87491 CHLMYD TRACH DNA AMP PROBE: CPT | Performed by: STUDENT IN AN ORGANIZED HEALTH CARE EDUCATION/TRAINING PROGRAM

## 2024-12-19 PROCEDURE — 99213 OFFICE O/P EST LOW 20 MIN: CPT | Mod: PBBFAC,TH,PN | Performed by: STUDENT IN AN ORGANIZED HEALTH CARE EDUCATION/TRAINING PROGRAM

## 2024-12-19 PROCEDURE — 88175 CYTOPATH C/V AUTO FLUID REDO: CPT | Performed by: STUDENT IN AN ORGANIZED HEALTH CARE EDUCATION/TRAINING PROGRAM

## 2024-12-19 RX ORDER — DROSPIRENONE 4 MG/1
4 TABLET, FILM COATED ORAL DAILY
Qty: 60 TABLET | Refills: 0 | Status: SHIPPED | OUTPATIENT
Start: 2024-12-19 | End: 2025-02-17

## 2024-12-19 NOTE — PATIENT INSTRUCTIONS
Local support:   Breastfeeding support: [employer]     [place] [employer] [place] [email or web address] [phone] (ext 2000)    Snuggles and struggles:  Free and open to the public, call [phone] or email [email or web address] for information     Mom to Mom:  [email or web address]    C[employer]ait:  [email or web address].    Get education and online support at postpartum.net  Talk @ [phone]   Text @ [phone]

## 2024-12-19 NOTE — PROGRESS NOTES
[facility] - Obstetrics And Gynecology  Obstetrics & Gynecology      History of Present Illness:   Rojelio Duong is here for her postaprtum visit after  delivery on 10/27. Delivery was complicated by 3Alac. Pregnancy was complicated by GBS, RNI, anemia, GTP. She is feeling well today. She denies bleeding, pain, F/C, N/V. Normal bowel and bladder function. Pumping and Breastfeeding is going well. Has had sex since the delivery without issu. She reports that going back to work is getting her anxious, depressed, but it is getting better and she feels as she gets older it will get better. Declines medication or therapy.  Baby is doing well. Contraceptive plans Patch      Current Outpatient Medications on File Prior to Visit   Medication Sig    acetaminophen (TYLENOL) 325 MG tablet Take 2 tablets (650 mg total) by mouth every 6 (six) hours. Alternate between ibuprofen and tylenol every 3 hours. For example: @0800: ibuprofen 600mg @1100: tylenol 650mg @1400: ibuprofen 600mg @1700: tylenol 650 mg @2000: ibuprofen 600mg    docusate sodium (COLACE) 100 MG capsule Take 2 capsules (200 mg total) by mouth 2 (two) times daily.    ferrous sulfate 325 (65 FE) MG EC tablet Take 1 tablet (325 mg total) by mouth once daily.    fluticasone propionate (FLONASE) 50 mcg/actuation nasal spray 1 spray (50 mcg total) by Each Nostril route once daily.    hydrocortisone (WESTCORT) 0.2 % cream Apply to eczema bid    ibuprofen (ADVIL,MOTRIN) 600 MG tablet Take 1 tablet (600 mg total) by mouth every 6 (six) hours. Alternate between ibuprofen and tylenol every 3 hours. For example: @0800: ibuprofen 600mg @1100: tylenol 650mg @1400: ibuprofen 600mg @1700: tylenol 650 mg @2000: ibuprofen 600mg    oxyCODONE (ROXICODONE) 5 MG immediate release tablet Take 1 tablet (5 mg total) by mouth every 4 (four) hours as needed for Pain.     No current facility-administered medications on file prior to visit.       Review of patient's allergies  indicates:  No Known Allergies    No past medical history on file.  OB History    Para Term  AB Living   1 1 1 0 0 1   SAB IAB Ectopic Multiple Live Births   0 0 0 0 1      # Outcome Date GA Lbr Yamil/2nd Weight Sex Type Anes PTL Lv   1 Term 10/21/24 39w3d  3.66 kg (8 lb 1.1 oz) F Vag-Spont EPI  PABLO      Name: Girl Rojelio Duong      Apgar1: 7  Apgar5: 9     No past surgical history on file.  Family History       Problem Relation (Age of Onset)    Asthma Brother    Diabetes Maternal Grandfather, Paternal Grandfather    Hypertension Paternal Grandfather          Tobacco Use    Smoking status: Never    Smokeless tobacco: Never   Substance and Sexual Activity    Alcohol use: No    Drug use: No    Sexual activity: Yes     Partners: Male     Review of Systems   Constitutional:  Negative for activity change, appetite change, chills and fever.   Respiratory:  Negative for shortness of breath.    Cardiovascular:  Negative for chest pain.   Gastrointestinal:  Negative for abdominal pain, blood in stool, constipation, diarrhea, nausea and vomiting.   Endocrine: Negative for diabetes.   Genitourinary:  Negative for dysuria, hematuria, pelvic pain, vaginal bleeding, vaginal discharge and vaginal pain.   Integumentary:  Negative for breast mass.   Neurological:  Negative for headaches.   Psychiatric/Behavioral:  Negative for depression. The patient is not nervous/anxious.    Breast: Negative for mass and mastodynia  Objective:     Vital Signs (Most Recent):    Vital Signs (24h Range):  [unfilled]        There is no height or weight on file to calculate BMI.  No LMP recorded.    Physical Exam:   Constitutional: She is oriented to person, place, and time. She appears well-developed and well-nourished. No distress.    HENT:   Head: Normocephalic and atraumatic.    Eyes: EOM are normal.      Pulmonary/Chest: Effort normal.          Genitourinary:    Vagina, uterus, right adnexa and left adnexa normal.   The external  female genitalia was normal.   Cervix is normal.           Musculoskeletal: Normal range of motion.       Neurological: She is alert and oriented to person, place, and time.    Skin: Skin is warm and dry. She is not diaphoretic.    Psychiatric: She has a normal mood and affect.     Lab Results   Component Value Date    WBC 16.94 (H) 10/22/2024    HGB 7.7 (L) 10/22/2024    HCT 24.2 (L) 10/22/2024    MCV 87 10/22/2024     (L) 10/22/2024           Assessment/Plan:       Assessment and Plan:  Pt is doing well postpartum. No complaints.  Laceration healing well  PPD screen negative. Community resources given  T2DM screen not indicated  Pap collected  CBC ordered  Contraceptive plans: Patient was counseled today on contraceptivel options--Pills, Patch, Depo-Provera, IUD, Nuva Ring, Nexplanon and Mirena/Sklya/Paragard and the pros and cons of each and advatantages of condoms to prevent STDs.  The patient elected to use the POP then patch. Proper use discussed as well as information on the effects of this medication on breastfeeding and on the postpartum patient. We also discussed keeping the package insert for quick access to instructions on how to properly make up for missed doses.   Pt is cleared for all activity    RTC for annual or PRN        Margarita Agarwal MD  Obstetrics & Gynecology  [facility] - Obstetrics And Gynecology

## 2024-12-20 LAB
C TRACH DNA SPEC QL NAA+PROBE: NOT DETECTED
CLINICAL INFO: NORMAL
DATE OF PREVIOUS PAP: NORMAL
DATE PREVIOUS BX: NO
LMP START DATE: NORMAL
N GONORRHOEA DNA SPEC QL NAA+PROBE: NOT DETECTED
SPECIMEN SOURCE CVX/VAG CYTO: NORMAL

## 2024-12-26 RX ORDER — NORETHINDRONE 0.35 MG/1
1 TABLET ORAL DAILY
Qty: 60 TABLET | Refills: 0 | Status: SHIPPED | OUTPATIENT
Start: 2024-12-26 | End: 2025-02-24

## 2025-02-12 ENCOUNTER — PATIENT MESSAGE (OUTPATIENT)
Dept: OBSTETRICS AND GYNECOLOGY | Facility: CLINIC | Age: 20
End: 2025-02-12
Payer: MEDICAID